# Patient Record
Sex: FEMALE | Race: WHITE | NOT HISPANIC OR LATINO | Employment: UNEMPLOYED | URBAN - METROPOLITAN AREA
[De-identification: names, ages, dates, MRNs, and addresses within clinical notes are randomized per-mention and may not be internally consistent; named-entity substitution may affect disease eponyms.]

---

## 2024-02-04 ENCOUNTER — APPOINTMENT (OUTPATIENT)
Dept: RADIOLOGY | Facility: HOSPITAL | Age: 44
End: 2024-02-04
Payer: MEDICAID

## 2024-02-04 PROCEDURE — 73610 X-RAY EXAM OF ANKLE: CPT | Mod: RT

## 2024-02-04 PROCEDURE — 99284 EMERGENCY DEPT VISIT MOD MDM: CPT | Performed by: EMERGENCY MEDICINE

## 2024-02-04 PROCEDURE — 73610 X-RAY EXAM OF ANKLE: CPT | Mod: RIGHT SIDE | Performed by: RADIOLOGY

## 2024-02-04 ASSESSMENT — PAIN - FUNCTIONAL ASSESSMENT: PAIN_FUNCTIONAL_ASSESSMENT: 0-10

## 2024-02-04 ASSESSMENT — COLUMBIA-SUICIDE SEVERITY RATING SCALE - C-SSRS
2. HAVE YOU ACTUALLY HAD ANY THOUGHTS OF KILLING YOURSELF?: NO
1. IN THE PAST MONTH, HAVE YOU WISHED YOU WERE DEAD OR WISHED YOU COULD GO TO SLEEP AND NOT WAKE UP?: NO
6. HAVE YOU EVER DONE ANYTHING, STARTED TO DO ANYTHING, OR PREPARED TO DO ANYTHING TO END YOUR LIFE?: NO

## 2024-02-04 ASSESSMENT — PAIN SCALES - GENERAL: PAINLEVEL_OUTOF10: 8

## 2024-02-05 ENCOUNTER — APPOINTMENT (OUTPATIENT)
Dept: RADIOLOGY | Facility: HOSPITAL | Age: 44
End: 2024-02-05
Payer: MEDICAID

## 2024-02-05 ENCOUNTER — HOSPITAL ENCOUNTER (EMERGENCY)
Facility: HOSPITAL | Age: 44
Discharge: HOME | End: 2024-02-05
Attending: EMERGENCY MEDICINE
Payer: MEDICAID

## 2024-02-05 VITALS
RESPIRATION RATE: 18 BRPM | WEIGHT: 220 LBS | SYSTOLIC BLOOD PRESSURE: 128 MMHG | DIASTOLIC BLOOD PRESSURE: 70 MMHG | HEART RATE: 75 BPM | BODY MASS INDEX: 31.5 KG/M2 | HEIGHT: 70 IN | OXYGEN SATURATION: 98 % | TEMPERATURE: 97.9 F

## 2024-02-05 DIAGNOSIS — S93.401A SPRAIN OF RIGHT ANKLE, UNSPECIFIED LIGAMENT, INITIAL ENCOUNTER: Primary | ICD-10-CM

## 2024-02-05 PROCEDURE — 2500000001 HC RX 250 WO HCPCS SELF ADMINISTERED DRUGS (ALT 637 FOR MEDICARE OP)

## 2024-02-05 PROCEDURE — 73630 X-RAY EXAM OF FOOT: CPT | Mod: RT,FR

## 2024-02-05 PROCEDURE — 73630 X-RAY EXAM OF FOOT: CPT | Mod: RIGHT SIDE | Performed by: RADIOLOGY

## 2024-02-05 RX ORDER — HYDROCODONE BITARTRATE AND ACETAMINOPHEN 5; 325 MG/1; MG/1
1 TABLET ORAL EVERY 6 HOURS PRN
Qty: 12 TABLET | Refills: 0 | Status: SHIPPED | OUTPATIENT
Start: 2024-02-05 | End: 2024-02-08

## 2024-02-05 RX ORDER — OXYCODONE AND ACETAMINOPHEN 5; 325 MG/1; MG/1
1 TABLET ORAL ONCE
Status: COMPLETED | OUTPATIENT
Start: 2024-02-05 | End: 2024-02-05

## 2024-02-05 RX ADMIN — OXYCODONE HYDROCHLORIDE AND ACETAMINOPHEN 1 TABLET: 5; 325 TABLET ORAL at 01:36

## 2024-02-05 ASSESSMENT — LIFESTYLE VARIABLES
EVER FELT BAD OR GUILTY ABOUT YOUR DRINKING: NO
EVER HAD A DRINK FIRST THING IN THE MORNING TO STEADY YOUR NERVES TO GET RID OF A HANGOVER: NO
HAVE PEOPLE ANNOYED YOU BY CRITICIZING YOUR DRINKING: NO
HAVE YOU EVER FELT YOU SHOULD CUT DOWN ON YOUR DRINKING: NO

## 2024-02-05 ASSESSMENT — PAIN SCALES - GENERAL: PAINLEVEL_OUTOF10: 2

## 2024-02-05 ASSESSMENT — PAIN - FUNCTIONAL ASSESSMENT: PAIN_FUNCTIONAL_ASSESSMENT: 0-10

## 2024-02-05 NOTE — ED PROVIDER NOTES
EMERGENCY DEPARTMENT ENCOUNTER      Pt Name: Suzie Avila  MRN: [id number]  Birthdate 1980  Date of evaluation: 2024  Provider: Jaylan Garcia DO    CHIEF COMPLAINT       Chief Complaint   Patient presents with    Fall     Pt states she missed last step and twisted ankle. Swelling noted around R ankle. Pulses noted. Sensation intact, unable to put weight on foot.          HISTORY OF PRESENT ILLNESS    This is a 43-year-old female who arrives to the emergency department chief complaint of right ankle pain and swelling after mechanical fall states that she was going down basement stairs and missed a step.  She denies loss of consciousness denies blood thinners denies hitting her head.  She has no nausea or vomiting no shortness of breath.  She states that after the injury she was unable to bear weight on it due to pain.  She does have history of sprains of the right ankle it has been years since she is sprained in the past.  She describes an inversion injury just prior to arrival.  She states that she was brought to the emergency department by her .  She has not tried any pain medication for the ankle pain yet.  She does have a history significant for GERD as well as anxiety.  History of  tonsillectomy no ankle surgeries.  She is a pack per day smoker, denies alcohol or drug use.  She is allergic to gabapentin.      History provided by:  Patient and medical records      Nursing Notes were reviewed.    PAST MEDICAL HISTORY   No past medical history on file.      SURGICAL HISTORY     No past surgical history on file.      CURRENT MEDICATIONS       Previous Medications    No medications on file       ALLERGIES     Gabapentin    FAMILY HISTORY     No family history on file.       SOCIAL HISTORY       Social History     Socioeconomic History    Marital status:      Spouse name: Not on file    Number of children: Not on file    Years of education: Not on file    Highest education level: Not  on file   Occupational History    Not on file   Tobacco Use    Smoking status: Not on file    Smokeless tobacco: Not on file   Substance and Sexual Activity    Alcohol use: Not on file    Drug use: Not on file    Sexual activity: Not on file   Other Topics Concern    Not on file   Social History Narrative    Not on file     Social Determinants of Health     Financial Resource Strain: Not on file   Food Insecurity: Not on file   Transportation Needs: Not on file   Physical Activity: Not on file   Stress: Not on file   Social Connections: Not on file   Intimate Partner Violence: Not on file   Housing Stability: Not on file       SCREENINGS                        PHYSICAL EXAM    (up to 7 for level 4, 8 or more for level 5)     ED Triage Vitals [02/04/24 2324]   Temperature Heart Rate Respirations BP   36.6 °C (97.9 °F) 70 18 121/62      Pulse Ox Temp Source Heart Rate Source Patient Position   97 % Temporal -- Sitting      BP Location FiO2 (%)     Right arm --       Physical Exam  Vitals and nursing note reviewed.   Constitutional:       General: She is not in acute distress.     Appearance: Normal appearance. She is well-developed. She is obese. She is not ill-appearing, toxic-appearing or diaphoretic.   HENT:      Head: Normocephalic and atraumatic.   Eyes:      Conjunctiva/sclera: Conjunctivae normal.   Cardiovascular:      Rate and Rhythm: Normal rate and regular rhythm.      Heart sounds: No murmur heard.  Pulmonary:      Effort: Pulmonary effort is normal. No respiratory distress.      Breath sounds: Normal breath sounds.   Abdominal:      Palpations: Abdomen is soft.      Tenderness: There is no abdominal tenderness.   Musculoskeletal:         General: Swelling and signs of injury present.      Cervical back: Neck supple.      Right ankle: Swelling, deformity and ecchymosis present. Decreased range of motion.   Skin:     General: Skin is warm and dry.      Capillary Refill: Capillary refill takes less than 2  seconds.   Neurological:      Mental Status: She is alert.   Psychiatric:         Mood and Affect: Mood normal.          DIAGNOSTIC RESULTS     LABS:  Labs Reviewed - No data to display    All other labs were within normal range or not returned as of this dictation.    Imaging  XR ankle right 3+ views   Final Result   No acute abnormality..   Signed by Deven Rapp DO           Procedures  Procedures     EMERGENCY DEPARTMENT COURSE/MDM:     ED Course as of 02/05/24 0155 Mon Feb 05, 2024 0042 X-ray of the ankle shows: There is no displaced fracture.  The alignment is anatomic.  No soft  tissue abnormality is seen.   [PV]   0059 X-ray of the foot ordered due to right fifth and fourth metatarsal tenderness.  Patient given a Percocet for pain management. [PV]      ED Course User Index  [PV] Jaylan Garcia DO         Diagnoses as of 02/05/24 0155   Sprain of right ankle, unspecified ligament, initial encounter        Medical Decision Making  This is a 43-year-old female arrives a chief complaint of right ankle pain and swelling.  Patient states she had a mechanical fall, missed a step.  Patient has not taken any medication for pain and x-ray of the right ankle was ordered.  There is no fracture found on x-ray.  Patient does have swelling, states she cannot bear weight on the ankle.  She does have present dorsalis pedis and posterior tibialis, equal motor and sensation in the toes.  Patient states that she was driven here by her .  Please see ED course for further medical decision making.No further fractures were seen on foot or ankle x-ray, she was given crutches and instructed on how to use crutches.Patient given orthopedic follow-up, understands plan of care.  Patient discharged home driven home by her .    =================Attending note===============    The patient was seen by the resident/fellow.  I have personally performed a substantive portion of the encounter.  I have seen and examined  the patient; agree with the workup, evaluation, MDM,   management and diagnosis.  The care plan has been discussed with the resident; I have reviewed the resident's note and agree with the documented findings.      43-year-old female to emerged part with chief complaint of right ankle and foot pain.  She was going down the steps and she missed the last step and inverted her ankle.  She not hit her head.  No blood thinners.  No nausea or vomiting.  No other injuries except for she did injure her fingernail on her right fifth finger.  There is no dizziness or passing out.  No chest pain or shortness of breath.  Her only medications are Prozac, Lyrica, Ativan.    Heart is regular.  Lungs are clear.  Abdomen is soft and nontender.  Head atraumatic.  No cervical spine tenderness.  No pain at the knee or over the proximal fibula.  There is tenderness over the lateral malleolus.  There is also some tenderness over the fourth and fifth metatarsals.  There is some mild swelling.  Sensation intact.  Cap refill brisk.  Pulses intact.    Ankle x-ray has no fractures.    Patient is placed in ankle stirrup brace.  She is given crutches.  She is discharged home.  She is to follow-up with primary care.  She is to follow-up with orthopedics.  She is to return to the nearest ER for any new or worsening symptoms.    ==========================================            Patient and or family in agreement and understanding of treatment plan.  All questions answered.      I reviewed the case with the attending ED physician. The attending ED physician agrees with the plan. Patient and/or patient´s representative was counseled regarding labs, imaging, likely diagnosis, and plan. All questions were answered.    ED Medications administered this visit:  Medications - No data to display    New Prescriptions from this visit:    New Prescriptions    No medications on file       Follow-up:  No follow-up provider specified.      Final Impression:  No diagnosis found.      (Please note that portions of this note were completed with a voice recognition program.  Efforts were made to edit the dictations but occasionally words are mis-transcribed.)     Jaylan Garcia,   Resident  02/05/24 0447

## 2024-02-05 NOTE — DISCHARGE INSTRUCTIONS
Seek immediate medical attention if you develop: increasing pain, numbness, tingling, weakness, loss of motion in your ankle or toes, your foot becomes pale or cold, or you develop any new or worsening symptoms.    Seek immediate medical attention if you develop: new or worsening headache, nausea, vomiting, confusion, weakness, loss of motion in your arms or legs, loss of control of your urine or stool, difficulty waking from sleep, or any new or worsening symptoms.    Have someone check on you and wake you from sleep every 2 hours for the next 24 hours to make sure that you are doing well.

## 2024-04-03 ENCOUNTER — HOSPITAL ENCOUNTER (EMERGENCY)
Facility: HOSPITAL | Age: 44
Discharge: HOME | End: 2024-04-03
Attending: STUDENT IN AN ORGANIZED HEALTH CARE EDUCATION/TRAINING PROGRAM
Payer: MEDICAID

## 2024-04-03 ENCOUNTER — APPOINTMENT (OUTPATIENT)
Dept: RADIOLOGY | Facility: HOSPITAL | Age: 44
End: 2024-04-03
Payer: MEDICAID

## 2024-04-03 VITALS
RESPIRATION RATE: 15 BRPM | DIASTOLIC BLOOD PRESSURE: 70 MMHG | HEART RATE: 76 BPM | WEIGHT: 220.46 LBS | OXYGEN SATURATION: 98 % | HEIGHT: 70 IN | TEMPERATURE: 98.1 F | SYSTOLIC BLOOD PRESSURE: 114 MMHG | BODY MASS INDEX: 31.56 KG/M2

## 2024-04-03 DIAGNOSIS — R51.9 NONINTRACTABLE HEADACHE, UNSPECIFIED CHRONICITY PATTERN, UNSPECIFIED HEADACHE TYPE: Primary | ICD-10-CM

## 2024-04-03 LAB
ALBUMIN SERPL BCP-MCNC: 4.6 G/DL (ref 3.4–5)
ALP SERPL-CCNC: 67 U/L (ref 33–110)
ALT SERPL W P-5'-P-CCNC: 9 U/L (ref 7–45)
ANION GAP SERPL CALC-SCNC: 13 MMOL/L (ref 10–20)
AST SERPL W P-5'-P-CCNC: 9 U/L (ref 9–39)
B-HCG SERPL-ACNC: <2 MIU/ML
BASOPHILS # BLD AUTO: 0.04 X10*3/UL (ref 0–0.1)
BASOPHILS NFR BLD AUTO: 0.2 %
BILIRUB SERPL-MCNC: 0.3 MG/DL (ref 0–1.2)
BUN SERPL-MCNC: 8 MG/DL (ref 6–23)
CALCIUM SERPL-MCNC: 9.9 MG/DL (ref 8.6–10.3)
CHLORIDE SERPL-SCNC: 101 MMOL/L (ref 98–107)
CO2 SERPL-SCNC: 26 MMOL/L (ref 21–32)
CREAT SERPL-MCNC: 1.04 MG/DL (ref 0.5–1.05)
CRP SERPL-MCNC: <0.1 MG/DL
EGFRCR SERPLBLD CKD-EPI 2021: 68 ML/MIN/1.73M*2
EOSINOPHIL # BLD AUTO: 0.35 X10*3/UL (ref 0–0.7)
EOSINOPHIL NFR BLD AUTO: 1.9 %
ERYTHROCYTE [DISTWIDTH] IN BLOOD BY AUTOMATED COUNT: 16.6 % (ref 11.5–14.5)
ERYTHROCYTE [SEDIMENTATION RATE] IN BLOOD BY WESTERGREN METHOD: 34 MM/H (ref 0–20)
FLUAV RNA RESP QL NAA+PROBE: NOT DETECTED
FLUBV RNA RESP QL NAA+PROBE: NOT DETECTED
GLUCOSE SERPL-MCNC: 84 MG/DL (ref 74–99)
HCT VFR BLD AUTO: 40.4 % (ref 36–46)
HGB BLD-MCNC: 12.5 G/DL (ref 12–16)
HOLD SPECIMEN: NORMAL
IMM GRANULOCYTES # BLD AUTO: 0.09 X10*3/UL (ref 0–0.7)
IMM GRANULOCYTES NFR BLD AUTO: 0.5 % (ref 0–0.9)
LYMPHOCYTES # BLD AUTO: 3.25 X10*3/UL (ref 1.2–4.8)
LYMPHOCYTES NFR BLD AUTO: 17.7 %
MCH RBC QN AUTO: 26 PG (ref 26–34)
MCHC RBC AUTO-ENTMCNC: 30.9 G/DL (ref 32–36)
MCV RBC AUTO: 84 FL (ref 80–100)
MONOCYTES # BLD AUTO: 1.23 X10*3/UL (ref 0.1–1)
MONOCYTES NFR BLD AUTO: 6.7 %
NEUTROPHILS # BLD AUTO: 13.41 X10*3/UL (ref 1.2–7.7)
NEUTROPHILS NFR BLD AUTO: 73 %
NRBC BLD-RTO: 0 /100 WBCS (ref 0–0)
PLATELET # BLD AUTO: 286 X10*3/UL (ref 150–450)
POTASSIUM SERPL-SCNC: 3.8 MMOL/L (ref 3.5–5.3)
PROT SERPL-MCNC: 7.4 G/DL (ref 6.4–8.2)
RBC # BLD AUTO: 4.81 X10*6/UL (ref 4–5.2)
SARS-COV-2 RNA RESP QL NAA+PROBE: NOT DETECTED
SODIUM SERPL-SCNC: 136 MMOL/L (ref 136–145)
WBC # BLD AUTO: 18.4 X10*3/UL (ref 4.4–11.3)

## 2024-04-03 PROCEDURE — 85652 RBC SED RATE AUTOMATED: CPT | Performed by: STUDENT IN AN ORGANIZED HEALTH CARE EDUCATION/TRAINING PROGRAM

## 2024-04-03 PROCEDURE — 87636 SARSCOV2 & INF A&B AMP PRB: CPT | Performed by: STUDENT IN AN ORGANIZED HEALTH CARE EDUCATION/TRAINING PROGRAM

## 2024-04-03 PROCEDURE — 86140 C-REACTIVE PROTEIN: CPT | Performed by: STUDENT IN AN ORGANIZED HEALTH CARE EDUCATION/TRAINING PROGRAM

## 2024-04-03 PROCEDURE — 2500000004 HC RX 250 GENERAL PHARMACY W/ HCPCS (ALT 636 FOR OP/ED)

## 2024-04-03 PROCEDURE — 2500000004 HC RX 250 GENERAL PHARMACY W/ HCPCS (ALT 636 FOR OP/ED): Performed by: STUDENT IN AN ORGANIZED HEALTH CARE EDUCATION/TRAINING PROGRAM

## 2024-04-03 PROCEDURE — 96361 HYDRATE IV INFUSION ADD-ON: CPT

## 2024-04-03 PROCEDURE — 84702 CHORIONIC GONADOTROPIN TEST: CPT | Performed by: STUDENT IN AN ORGANIZED HEALTH CARE EDUCATION/TRAINING PROGRAM

## 2024-04-03 PROCEDURE — 70450 CT HEAD/BRAIN W/O DYE: CPT

## 2024-04-03 PROCEDURE — 85025 COMPLETE CBC W/AUTO DIFF WBC: CPT | Performed by: STUDENT IN AN ORGANIZED HEALTH CARE EDUCATION/TRAINING PROGRAM

## 2024-04-03 PROCEDURE — 64505 N BLOCK SPENOPALATINE GANGL: CPT

## 2024-04-03 PROCEDURE — 36415 COLL VENOUS BLD VENIPUNCTURE: CPT | Performed by: STUDENT IN AN ORGANIZED HEALTH CARE EDUCATION/TRAINING PROGRAM

## 2024-04-03 PROCEDURE — 99284 EMERGENCY DEPT VISIT MOD MDM: CPT | Mod: 25

## 2024-04-03 PROCEDURE — 96375 TX/PRO/DX INJ NEW DRUG ADDON: CPT

## 2024-04-03 PROCEDURE — 70450 CT HEAD/BRAIN W/O DYE: CPT | Performed by: RADIOLOGY

## 2024-04-03 PROCEDURE — 2500000001 HC RX 250 WO HCPCS SELF ADMINISTERED DRUGS (ALT 637 FOR MEDICARE OP): Performed by: STUDENT IN AN ORGANIZED HEALTH CARE EDUCATION/TRAINING PROGRAM

## 2024-04-03 PROCEDURE — 96365 THER/PROPH/DIAG IV INF INIT: CPT

## 2024-04-03 PROCEDURE — 80053 COMPREHEN METABOLIC PANEL: CPT | Performed by: STUDENT IN AN ORGANIZED HEALTH CARE EDUCATION/TRAINING PROGRAM

## 2024-04-03 PROCEDURE — 96366 THER/PROPH/DIAG IV INF ADDON: CPT

## 2024-04-03 PROCEDURE — 2500000005 HC RX 250 GENERAL PHARMACY W/O HCPCS

## 2024-04-03 RX ORDER — KETOROLAC TROMETHAMINE 30 MG/ML
30 INJECTION, SOLUTION INTRAMUSCULAR; INTRAVENOUS ONCE
Status: COMPLETED | OUTPATIENT
Start: 2024-04-03 | End: 2024-04-03

## 2024-04-03 RX ORDER — OXYCODONE AND ACETAMINOPHEN 5; 325 MG/1; MG/1
1 TABLET ORAL ONCE
Status: DISCONTINUED | OUTPATIENT
Start: 2024-04-03 | End: 2024-04-03

## 2024-04-03 RX ORDER — LIDOCAINE HYDROCHLORIDE 20 MG/ML
5 INJECTION, SOLUTION EPIDURAL; INFILTRATION; INTRACAUDAL; PERINEURAL ONCE
Status: COMPLETED | OUTPATIENT
Start: 2024-04-03 | End: 2024-04-03

## 2024-04-03 RX ORDER — OXYCODONE AND ACETAMINOPHEN 5; 325 MG/1; MG/1
1 TABLET ORAL ONCE
Status: COMPLETED | OUTPATIENT
Start: 2024-04-03 | End: 2024-04-03

## 2024-04-03 RX ORDER — METOCLOPRAMIDE HYDROCHLORIDE 5 MG/ML
5 INJECTION INTRAMUSCULAR; INTRAVENOUS ONCE
Status: COMPLETED | OUTPATIENT
Start: 2024-04-03 | End: 2024-04-03

## 2024-04-03 RX ORDER — DIPHENHYDRAMINE HYDROCHLORIDE 50 MG/ML
25 INJECTION INTRAMUSCULAR; INTRAVENOUS ONCE
Status: COMPLETED | OUTPATIENT
Start: 2024-04-03 | End: 2024-04-03

## 2024-04-03 RX ORDER — MAGNESIUM SULFATE HEPTAHYDRATE 40 MG/ML
2 INJECTION, SOLUTION INTRAVENOUS ONCE
Status: COMPLETED | OUTPATIENT
Start: 2024-04-03 | End: 2024-04-03

## 2024-04-03 RX ORDER — ACETAMINOPHEN 325 MG/1
650 TABLET ORAL ONCE
Status: COMPLETED | OUTPATIENT
Start: 2024-04-03 | End: 2024-04-03

## 2024-04-03 RX ADMIN — METOCLOPRAMIDE 5 MG: 5 INJECTION, SOLUTION INTRAMUSCULAR; INTRAVENOUS at 02:26

## 2024-04-03 RX ADMIN — SODIUM CHLORIDE, POTASSIUM CHLORIDE, SODIUM LACTATE AND CALCIUM CHLORIDE 1000 ML: 600; 310; 30; 20 INJECTION, SOLUTION INTRAVENOUS at 02:25

## 2024-04-03 RX ADMIN — LIDOCAINE HYDROCHLORIDE 100 MG: 20 INJECTION, SOLUTION EPIDURAL; INFILTRATION; INTRACAUDAL; PERINEURAL at 05:42

## 2024-04-03 RX ADMIN — KETOROLAC TROMETHAMINE 30 MG: 30 INJECTION, SOLUTION INTRAMUSCULAR; INTRAVENOUS at 04:23

## 2024-04-03 RX ADMIN — ACETAMINOPHEN 650 MG: 325 TABLET ORAL at 04:21

## 2024-04-03 RX ADMIN — OXYCODONE HYDROCHLORIDE AND ACETAMINOPHEN 1 TABLET: 5; 325 TABLET ORAL at 06:35

## 2024-04-03 RX ADMIN — DEXAMETHASONE 10 MG: 6 TABLET ORAL at 04:38

## 2024-04-03 RX ADMIN — MAGNESIUM SULFATE HEPTAHYDRATE 2 G: 40 INJECTION, SOLUTION INTRAVENOUS at 04:38

## 2024-04-03 RX ADMIN — DIPHENHYDRAMINE HYDROCHLORIDE 25 MG: 50 INJECTION, SOLUTION INTRAMUSCULAR; INTRAVENOUS at 02:26

## 2024-04-03 ASSESSMENT — PAIN DESCRIPTION - FREQUENCY: FREQUENCY: CONSTANT/CONTINUOUS

## 2024-04-03 ASSESSMENT — PAIN SCALES - GENERAL
PAINLEVEL_OUTOF10: 10 - WORST POSSIBLE PAIN
PAINLEVEL_OUTOF10: 5 - MODERATE PAIN
PAINLEVEL_OUTOF10: 8
PAINLEVEL_OUTOF10: 8

## 2024-04-03 ASSESSMENT — PAIN DESCRIPTION - LOCATION
LOCATION: HEAD

## 2024-04-03 ASSESSMENT — PAIN - FUNCTIONAL ASSESSMENT
PAIN_FUNCTIONAL_ASSESSMENT: 0-10
PAIN_FUNCTIONAL_ASSESSMENT: 0-10

## 2024-04-03 ASSESSMENT — PAIN DESCRIPTION - ONSET: ONSET: GRADUAL

## 2024-04-03 ASSESSMENT — PAIN DESCRIPTION - ORIENTATION
ORIENTATION: RIGHT
ORIENTATION: RIGHT

## 2024-04-03 ASSESSMENT — PAIN DESCRIPTION - DIRECTION: RADIATING_TOWARDS: EARS

## 2024-04-03 ASSESSMENT — PAIN DESCRIPTION - PROGRESSION: CLINICAL_PROGRESSION: GRADUALLY WORSENING

## 2024-04-03 NOTE — ED PROVIDER NOTES
HPI   Chief Complaint   Patient presents with    Headache     Pt has had a head ache for two days.  States her left ear also feels fuzzy and her right ear feels like t is being jabbed by a knife sharpening tool       Patient is a 44-year-old female with history of migraines presenting to the emergency department for headache.  It was gradual in onset and has been present over the last 2 days.  She feels as if her right eye is being stabbed through her head.  She also feels like her ears are full or fuzzy.  She does have history of migraines but this does not feel like a typical migraine for her.  She has tried taking Excedrin at home without pain relief.  She feels as if her eyes have been blurry, although she is not completely sure.  She does wear glasses.  She denies any fevers, chest pain, shortness of breath, abdominal pain.  She does report nausea, denies vomiting.      History provided by:  Patient                      [place] Coma Scale Score: 15         NIH Stroke Scale: 0             Patient History   History reviewed. No pertinent past medical history.  History reviewed. No pertinent surgical history.  No family history on file.  Social History     Tobacco Use    Smoking status: Every Day     Packs/day: 1     Types: Cigarettes    Smokeless tobacco: Never   Vaping Use    Vaping Use: Never used   Substance Use Topics    Alcohol use: Never    Drug use: Never       Physical Exam   ED Triage Vitals [04/03/24 0026]   Temperature Heart Rate Respirations BP   36.5 °C (97.7 °F) 88 18 (!) 145/93      Pulse Ox Temp Source Heart Rate Source Patient Position   98 % Temporal Monitor Sitting      BP Location FiO2 (%)     Right arm --       Physical Exam  Vitals and nursing note reviewed.   Constitutional:       General: She is not in acute distress.     Appearance: She is well-developed.   HENT:      Head: Normocephalic and atraumatic.      Comments: Tenderness of the right temple.     Right Ear: External ear normal.       Left Ear: External ear normal.      Nose: Nose normal.      Mouth/Throat:      Mouth: Mucous membranes are moist.   Eyes:      General: No scleral icterus.     Extraocular Movements: Extraocular movements intact.      Conjunctiva/sclera: Conjunctivae normal.      Pupils: Pupils are equal, round, and reactive to light.   Cardiovascular:      Rate and Rhythm: Normal rate and regular rhythm.      Heart sounds: No murmur heard.  Pulmonary:      Effort: Pulmonary effort is normal. No respiratory distress.      Breath sounds: Normal breath sounds.   Abdominal:      Palpations: Abdomen is soft.      Tenderness: There is no abdominal tenderness.   Musculoskeletal:         General: No swelling.      Cervical back: Neck supple.   Skin:     General: Skin is warm and dry.   Neurological:      General: No focal deficit present.      Mental Status: She is alert and oriented to person, place, and time.      Cranial Nerves: No cranial nerve deficit.      Sensory: No sensory deficit.      Motor: No weakness.      Coordination: Coordination normal.   Psychiatric:         Mood and Affect: Mood normal.         ED Course & MDM   Diagnoses as of 04/03/24 0729   Nonintractable headache, unspecified chronicity pattern, unspecified headache type       Medical Decision Making  Patient is a 44-year-old female presenting to the emergency department for headache.  On arrival, she is afebrile and hemodynamically stable.  No focal neurological deficits.  No visual field deficits.  This headache was slow in onset rather than a sudden headache.  Low suspicion for subarachnoid hemorrhage.  She is afebrile and does not have any neck stiffness.  Low suspicion for meningitis.  Given that she feels like this headache is different than her usual migraines, we will obtain CT head to eval for any abnormalities.  Patient is given Reglan, IV fluids, Benadryl.  She does report that she has tenderness of her right temple.  Will obtain lab work and ESR and CRP  to evaluate for temporal arteritis.    CBC shows leukocytosis 18.4.  Unclear etiology.  No anemia.  CMP unremarkable.  Negative pregnancy.  CRP within normal limits.  ESR slightly elevated at 34.  Low suspicion for temporal arteritis based on lab work.  CT head unremarkable.    Patient is reevaluated.  She reports that she still has a headache.  She is given dexamethasone, Toradol, magnesium, Tylenol.  On reevaluation, patient reports that her pain is still 8/10.  Sphenopalatine ganglion block is performed with 2% lidocaine without epinephrine.  She reports some mild improvement but is still having the same headache.  At this time, patient has received multimodal and multiple doses of medications.  Lumbar puncture for meningitis rule out and admission to the hospital for further management treatment was offered for the patient.  However, after discussion of the risk and benefits, patient would like to attempt to go home and rest.  She is given neurology follow-up and instructed to follow-up with neurology as well as her primary care physician.  She is instructed to return to the hospital if she should develop any symptoms such as neck stiffness, fevers, worsening pain, confusion, or if they have any questions or concerns.  Patient's  will be at home with her.  Home care and return instructions discussed. Patient expressed understanding and agreement. Patient discharged in stable condition.    Efe Holt DO, PGY-3  Emergency Medicine Resident        Amount and/or Complexity of Data Reviewed  Labs: ordered.  Radiology: ordered.  ECG/medicine tests: ordered.        Procedure  Procedures     Efe Holt DO  Resident  04/03/24 2538

## 2024-05-31 ENCOUNTER — HOSPITAL ENCOUNTER (EMERGENCY)
Facility: HOSPITAL | Age: 44
Discharge: HOME | End: 2024-05-31
Attending: STUDENT IN AN ORGANIZED HEALTH CARE EDUCATION/TRAINING PROGRAM
Payer: MEDICAID

## 2024-05-31 VITALS
TEMPERATURE: 97 F | HEART RATE: 60 BPM | RESPIRATION RATE: 16 BRPM | SYSTOLIC BLOOD PRESSURE: 131 MMHG | OXYGEN SATURATION: 100 % | HEIGHT: 70 IN | WEIGHT: 220 LBS | BODY MASS INDEX: 31.5 KG/M2 | DIASTOLIC BLOOD PRESSURE: 64 MMHG

## 2024-05-31 DIAGNOSIS — H10.32 ACUTE CONJUNCTIVITIS OF LEFT EYE, UNSPECIFIED ACUTE CONJUNCTIVITIS TYPE: Primary | ICD-10-CM

## 2024-05-31 PROCEDURE — 99283 EMERGENCY DEPT VISIT LOW MDM: CPT

## 2024-05-31 PROCEDURE — 99284 EMERGENCY DEPT VISIT MOD MDM: CPT | Performed by: STUDENT IN AN ORGANIZED HEALTH CARE EDUCATION/TRAINING PROGRAM

## 2024-05-31 RX ORDER — ERYTHROMYCIN 5 MG/G
OINTMENT OPHTHALMIC 4 TIMES DAILY
Qty: 3.5 G | Refills: 0 | Status: SHIPPED | OUTPATIENT
Start: 2024-05-31 | End: 2024-06-07

## 2024-05-31 ASSESSMENT — PAIN - FUNCTIONAL ASSESSMENT: PAIN_FUNCTIONAL_ASSESSMENT: 0-10

## 2024-05-31 ASSESSMENT — LIFESTYLE VARIABLES
TOTAL SCORE: 0
HAVE PEOPLE ANNOYED YOU BY CRITICIZING YOUR DRINKING: NO
EVER HAD A DRINK FIRST THING IN THE MORNING TO STEADY YOUR NERVES TO GET RID OF A HANGOVER: NO
EVER FELT BAD OR GUILTY ABOUT YOUR DRINKING: NO
HAVE YOU EVER FELT YOU SHOULD CUT DOWN ON YOUR DRINKING: NO

## 2024-05-31 ASSESSMENT — PAIN DESCRIPTION - PAIN TYPE: TYPE: ACUTE PAIN

## 2024-05-31 ASSESSMENT — PAIN DESCRIPTION - LOCATION: LOCATION: EYE

## 2024-05-31 ASSESSMENT — PAIN DESCRIPTION - DESCRIPTORS: DESCRIPTORS: ACHING

## 2024-05-31 ASSESSMENT — PAIN DESCRIPTION - ORIENTATION: ORIENTATION: LEFT

## 2024-05-31 ASSESSMENT — PAIN SCALES - GENERAL: PAINLEVEL_OUTOF10: 9

## 2024-05-31 NOTE — DISCHARGE INSTRUCTIONS
Follow-up with your primary care provider as well as ophthalmology discuss your ER visit.  If you develop any vision changes, swelling of the eye or eyelids, or if you have any other concerns, please return to the ER for further care.

## 2024-05-31 NOTE — ED PROVIDER NOTES
"HPI   Chief Complaint   Patient presents with    Eye Drainage     Pt c/o pink eye which is painful and has been matting closed with green \"gunk\". Pt states that two other people in her house have been diagnosed with pink eye.        44-year-old female with no significant past medical history presented to the ER due to concern for potential pinkeye.  Patient states that she awoke this evening with crusting over her left eye as well as hair in her eye.  She reported greenish discharge.  She reports that pinkeye is running through family and her  currently has it.  She otherwise denies any fevers, chills, chest pain, shortness of breath, abdominal pain, sore throat, ear pain.      History provided by:  Patient   used: No                        [place] Coma Scale Score: 15                     Patient History   History reviewed. No pertinent past medical history.  History reviewed. No pertinent surgical history.  No family history on file.  Social History     Tobacco Use    Smoking status: Every Day     Current packs/day: 1.00     Types: Cigarettes    Smokeless tobacco: Never   Vaping Use    Vaping status: Never Used   Substance Use Topics    Alcohol use: Never    Drug use: Never       Physical Exam   ED Triage Vitals [05/31/24 0050]   Temperature Heart Rate Respirations BP   36.1 °C (97 °F) 66 18 136/75      Pulse Ox Temp Source Heart Rate Source Patient Position   99 % Temporal Monitor Sitting      BP Location FiO2 (%)     Right arm --       Physical Exam  Vitals and nursing note reviewed.   HENT:      Head: Normocephalic.      Right Ear: External ear normal.      Left Ear: External ear normal.      Nose: Nose normal.      Mouth/Throat:      Mouth: Mucous membranes are moist.   Eyes:      Extraocular Movements: Extraocular movements intact.      Pupils: Pupils are equal, round, and reactive to light.      Comments: Left eye conjunctiva mildly erythematous compared to right   Cardiovascular: "      Rate and Rhythm: Normal rate and regular rhythm.   Pulmonary:      Effort: Pulmonary effort is normal.      Breath sounds: No wheezing or rhonchi.   Abdominal:      General: Abdomen is flat. There is no distension.      Tenderness: There is no abdominal tenderness.   Musculoskeletal:         General: No signs of injury.   Skin:     General: Skin is warm.      Capillary Refill: Capillary refill takes less than 2 seconds.   Neurological:      General: No focal deficit present.      Mental Status: She is alert. Mental status is at baseline.   Psychiatric:         Mood and Affect: Mood normal.         ED Course & MDM   Diagnoses as of 05/31/24 0117   Acute conjunctivitis of left eye, unspecified acute conjunctivitis type       Medical Decision Making  44-year-old female present to the ER due to concern for conjunctivitis. On arrival she was in no acute distress. Vitals are stable. Left eye mildly erythematous conjunctivocompared to right.  No signs of entrapment pupils equal and reactive to light bilaterally.  Will cover patient for conjunctivitis.  Prescription of erythromycin ointment ordered for patient as she requested an ointment versus drops.  She was instructed to follow-up with her primary care provider as well as with ophthalmology.  Strict return precautions were given.  Patient was understanding and agreeable with plan for discharge.    Risk  Prescription drug management.        Procedure  Procedures     Armen Ruiz DO  Resident  05/31/24 0119

## 2025-02-08 ENCOUNTER — APPOINTMENT (OUTPATIENT)
Dept: RADIOLOGY | Facility: HOSPITAL | Age: 45
End: 2025-02-08
Payer: MEDICAID

## 2025-02-08 ENCOUNTER — HOSPITAL ENCOUNTER (EMERGENCY)
Facility: HOSPITAL | Age: 45
Discharge: HOME | End: 2025-02-09
Attending: EMERGENCY MEDICINE
Payer: MEDICAID

## 2025-02-08 DIAGNOSIS — R11.0 NAUSEA: ICD-10-CM

## 2025-02-08 DIAGNOSIS — R10.9 FLANK PAIN: Primary | ICD-10-CM

## 2025-02-08 DIAGNOSIS — N20.0 BILATERAL KIDNEY STONES: ICD-10-CM

## 2025-02-08 DIAGNOSIS — N12 PYELONEPHRITIS: ICD-10-CM

## 2025-02-08 LAB
ALBUMIN SERPL BCP-MCNC: 4.7 G/DL (ref 3.4–5)
ALP SERPL-CCNC: 70 U/L (ref 33–110)
ALT SERPL W P-5'-P-CCNC: 9 U/L (ref 7–45)
ANION GAP SERPL CALC-SCNC: 12 MMOL/L (ref 10–20)
APPEARANCE UR: ABNORMAL
AST SERPL W P-5'-P-CCNC: 11 U/L (ref 9–39)
B-HCG SERPL-ACNC: <2 MIU/ML
BACTERIA #/AREA URNS AUTO: ABNORMAL /HPF
BASOPHILS # BLD AUTO: 0.02 X10*3/UL (ref 0–0.1)
BASOPHILS NFR BLD AUTO: 0.3 %
BILIRUB SERPL-MCNC: 0.2 MG/DL (ref 0–1.2)
BILIRUB UR STRIP.AUTO-MCNC: NEGATIVE MG/DL
BUN SERPL-MCNC: 11 MG/DL (ref 6–23)
CALCIUM SERPL-MCNC: 9.1 MG/DL (ref 8.6–10.3)
CHLORIDE SERPL-SCNC: 100 MMOL/L (ref 98–107)
CO2 SERPL-SCNC: 27 MMOL/L (ref 21–32)
COLOR UR: ABNORMAL
CREAT SERPL-MCNC: 0.8 MG/DL (ref 0.5–1.05)
EGFRCR SERPLBLD CKD-EPI 2021: >90 ML/MIN/1.73M*2
EOSINOPHIL # BLD AUTO: 0.21 X10*3/UL (ref 0–0.7)
EOSINOPHIL NFR BLD AUTO: 2.8 %
ERYTHROCYTE [DISTWIDTH] IN BLOOD BY AUTOMATED COUNT: 18.5 % (ref 11.5–14.5)
GLUCOSE SERPL-MCNC: 87 MG/DL (ref 74–99)
GLUCOSE UR STRIP.AUTO-MCNC: NORMAL MG/DL
HCT VFR BLD AUTO: 40 % (ref 36–46)
HGB BLD-MCNC: 12.3 G/DL (ref 12–16)
IMM GRANULOCYTES # BLD AUTO: 0.02 X10*3/UL (ref 0–0.7)
IMM GRANULOCYTES NFR BLD AUTO: 0.3 % (ref 0–0.9)
KETONES UR STRIP.AUTO-MCNC: NEGATIVE MG/DL
LEUKOCYTE ESTERASE UR QL STRIP.AUTO: ABNORMAL
LIPASE SERPL-CCNC: 10 U/L (ref 9–82)
LYMPHOCYTES # BLD AUTO: 1.67 X10*3/UL (ref 1.2–4.8)
LYMPHOCYTES NFR BLD AUTO: 22 %
MCH RBC QN AUTO: 26.6 PG (ref 26–34)
MCHC RBC AUTO-ENTMCNC: 30.8 G/DL (ref 32–36)
MCV RBC AUTO: 87 FL (ref 80–100)
MONOCYTES # BLD AUTO: 0.62 X10*3/UL (ref 0.1–1)
MONOCYTES NFR BLD AUTO: 8.2 %
NEUTROPHILS # BLD AUTO: 5.04 X10*3/UL (ref 1.2–7.7)
NEUTROPHILS NFR BLD AUTO: 66.4 %
NITRITE UR QL STRIP.AUTO: NEGATIVE
NRBC BLD-RTO: 0 /100 WBCS (ref 0–0)
PH UR STRIP.AUTO: 6 [PH]
PLATELET # BLD AUTO: 252 X10*3/UL (ref 150–450)
POTASSIUM SERPL-SCNC: 3.9 MMOL/L (ref 3.5–5.3)
PROT SERPL-MCNC: 7.8 G/DL (ref 6.4–8.2)
PROT UR STRIP.AUTO-MCNC: ABNORMAL MG/DL
RBC # BLD AUTO: 4.62 X10*6/UL (ref 4–5.2)
RBC # UR STRIP.AUTO: ABNORMAL MG/DL
RBC #/AREA URNS AUTO: >20 /HPF
SODIUM SERPL-SCNC: 135 MMOL/L (ref 136–145)
SP GR UR STRIP.AUTO: 1.02
SQUAMOUS #/AREA URNS AUTO: ABNORMAL /HPF
UROBILINOGEN UR STRIP.AUTO-MCNC: NORMAL MG/DL
WBC # BLD AUTO: 7.6 X10*3/UL (ref 4.4–11.3)
WBC #/AREA URNS AUTO: >50 /HPF

## 2025-02-08 PROCEDURE — 85025 COMPLETE CBC W/AUTO DIFF WBC: CPT

## 2025-02-08 PROCEDURE — 2500000004 HC RX 250 GENERAL PHARMACY W/ HCPCS (ALT 636 FOR OP/ED)

## 2025-02-08 PROCEDURE — 99285 EMERGENCY DEPT VISIT HI MDM: CPT | Mod: 25 | Performed by: EMERGENCY MEDICINE

## 2025-02-08 PROCEDURE — 74177 CT ABD & PELVIS W/CONTRAST: CPT

## 2025-02-08 PROCEDURE — 84702 CHORIONIC GONADOTROPIN TEST: CPT

## 2025-02-08 PROCEDURE — 96365 THER/PROPH/DIAG IV INF INIT: CPT | Mod: 59

## 2025-02-08 PROCEDURE — 99285 EMERGENCY DEPT VISIT HI MDM: CPT | Performed by: EMERGENCY MEDICINE

## 2025-02-08 PROCEDURE — 80053 COMPREHEN METABOLIC PANEL: CPT

## 2025-02-08 PROCEDURE — 2550000001 HC RX 255 CONTRASTS: Performed by: EMERGENCY MEDICINE

## 2025-02-08 PROCEDURE — 87086 URINE CULTURE/COLONY COUNT: CPT | Mod: STJLAB | Performed by: STUDENT IN AN ORGANIZED HEALTH CARE EDUCATION/TRAINING PROGRAM

## 2025-02-08 PROCEDURE — 96375 TX/PRO/DX INJ NEW DRUG ADDON: CPT

## 2025-02-08 PROCEDURE — 36415 COLL VENOUS BLD VENIPUNCTURE: CPT

## 2025-02-08 PROCEDURE — 83690 ASSAY OF LIPASE: CPT

## 2025-02-08 PROCEDURE — 74177 CT ABD & PELVIS W/CONTRAST: CPT | Performed by: STUDENT IN AN ORGANIZED HEALTH CARE EDUCATION/TRAINING PROGRAM

## 2025-02-08 PROCEDURE — 81001 URINALYSIS AUTO W/SCOPE: CPT | Performed by: STUDENT IN AN ORGANIZED HEALTH CARE EDUCATION/TRAINING PROGRAM

## 2025-02-08 RX ORDER — KETOROLAC TROMETHAMINE 15 MG/ML
15 INJECTION, SOLUTION INTRAMUSCULAR; INTRAVENOUS ONCE
Status: COMPLETED | OUTPATIENT
Start: 2025-02-08 | End: 2025-02-08

## 2025-02-08 RX ORDER — ONDANSETRON HYDROCHLORIDE 2 MG/ML
4 INJECTION, SOLUTION INTRAVENOUS ONCE
Status: COMPLETED | OUTPATIENT
Start: 2025-02-08 | End: 2025-02-08

## 2025-02-08 RX ADMIN — CEFTRIAXONE 2 G: 2 INJECTION, POWDER, FOR SOLUTION INTRAMUSCULAR; INTRAVENOUS at 22:48

## 2025-02-08 RX ADMIN — ONDANSETRON 4 MG: 2 INJECTION INTRAMUSCULAR; INTRAVENOUS at 22:45

## 2025-02-08 RX ADMIN — IOHEXOL 75 ML: 350 INJECTION, SOLUTION INTRAVENOUS at 23:29

## 2025-02-08 RX ADMIN — KETOROLAC TROMETHAMINE 15 MG: 15 INJECTION, SOLUTION INTRAMUSCULAR; INTRAVENOUS at 22:46

## 2025-02-08 ASSESSMENT — PAIN DESCRIPTION - LOCATION
LOCATION: BACK
LOCATION: ABDOMEN

## 2025-02-08 ASSESSMENT — PAIN SCALES - GENERAL
PAINLEVEL_OUTOF10: 10 - WORST POSSIBLE PAIN
PAINLEVEL_OUTOF10: 9
PAINLEVEL_OUTOF10: 6

## 2025-02-08 ASSESSMENT — PAIN - FUNCTIONAL ASSESSMENT
PAIN_FUNCTIONAL_ASSESSMENT: 0-10
PAIN_FUNCTIONAL_ASSESSMENT: 0-10

## 2025-02-08 ASSESSMENT — PAIN DESCRIPTION - ORIENTATION: ORIENTATION: RIGHT

## 2025-02-08 ASSESSMENT — PAIN DESCRIPTION - PAIN TYPE
TYPE: ACUTE PAIN;CHRONIC PAIN
TYPE: ACUTE PAIN

## 2025-02-08 ASSESSMENT — PAIN DESCRIPTION - DIRECTION: RADIATING_TOWARDS: ABDOMEN

## 2025-02-08 ASSESSMENT — COLUMBIA-SUICIDE SEVERITY RATING SCALE - C-SSRS
1. IN THE PAST MONTH, HAVE YOU WISHED YOU WERE DEAD OR WISHED YOU COULD GO TO SLEEP AND NOT WAKE UP?: NO
6. HAVE YOU EVER DONE ANYTHING, STARTED TO DO ANYTHING, OR PREPARED TO DO ANYTHING TO END YOUR LIFE?: NO
2. HAVE YOU ACTUALLY HAD ANY THOUGHTS OF KILLING YOURSELF?: NO

## 2025-02-08 ASSESSMENT — PAIN DESCRIPTION - PROGRESSION: CLINICAL_PROGRESSION: NOT CHANGED

## 2025-02-08 ASSESSMENT — PAIN DESCRIPTION - DESCRIPTORS: DESCRIPTORS: ACHING;SHARP;SHOOTING

## 2025-02-08 ASSESSMENT — PAIN DESCRIPTION - FREQUENCY: FREQUENCY: CONSTANT/CONTINUOUS

## 2025-02-08 ASSESSMENT — PAIN DESCRIPTION - ONSET: ONSET: ONGOING

## 2025-02-09 VITALS
DIASTOLIC BLOOD PRESSURE: 84 MMHG | WEIGHT: 231 LBS | SYSTOLIC BLOOD PRESSURE: 144 MMHG | TEMPERATURE: 97.2 F | BODY MASS INDEX: 33.07 KG/M2 | RESPIRATION RATE: 16 BRPM | HEART RATE: 60 BPM | HEIGHT: 70 IN | OXYGEN SATURATION: 96 %

## 2025-02-09 LAB — HOLD SPECIMEN: NORMAL

## 2025-02-09 PROCEDURE — 2500000004 HC RX 250 GENERAL PHARMACY W/ HCPCS (ALT 636 FOR OP/ED): Mod: JZ

## 2025-02-09 PROCEDURE — 96375 TX/PRO/DX INJ NEW DRUG ADDON: CPT

## 2025-02-09 RX ORDER — ONDANSETRON 4 MG/1
4 TABLET, ORALLY DISINTEGRATING ORAL EVERY 8 HOURS PRN
Qty: 15 TABLET | Refills: 0 | Status: SHIPPED | OUTPATIENT
Start: 2025-02-09

## 2025-02-09 RX ORDER — SULFAMETHOXAZOLE AND TRIMETHOPRIM 800; 160 MG/1; MG/1
1 TABLET ORAL 2 TIMES DAILY
Qty: 28 TABLET | Refills: 0 | Status: SHIPPED | OUTPATIENT
Start: 2025-02-09 | End: 2025-02-23

## 2025-02-09 RX ORDER — OXYCODONE AND ACETAMINOPHEN 5; 325 MG/1; MG/1
1 TABLET ORAL EVERY 6 HOURS PRN
Qty: 12 TABLET | Refills: 0 | Status: SHIPPED | OUTPATIENT
Start: 2025-02-09

## 2025-02-09 RX ADMIN — HYDROMORPHONE HYDROCHLORIDE 0.5 MG: 1 INJECTION, SOLUTION INTRAMUSCULAR; INTRAVENOUS; SUBCUTANEOUS at 00:41

## 2025-02-09 ASSESSMENT — PAIN SCALES - GENERAL
PAINLEVEL_OUTOF10: 6
PAINLEVEL_OUTOF10: 9
PAINLEVEL_OUTOF10: 6

## 2025-02-09 ASSESSMENT — PAIN - FUNCTIONAL ASSESSMENT
PAIN_FUNCTIONAL_ASSESSMENT: 0-10
PAIN_FUNCTIONAL_ASSESSMENT: 0-10

## 2025-02-09 NOTE — DISCHARGE INSTRUCTIONS
Chief Complaint   Patient presents with    Follow-up       HPI:  Patient is here for follow-up     Feeling okay    No complaints    Had labs done at [facility]- reports are not available    Has appt with urology    Seen by his cardiology - note reviewed        Allergy and Medications are reviewed and updated. Past Medical History, Surgical History, and Family History has been reviewed and updated. Review of Systems:  Review of Systems   Constitutional:  Negative for chills and fever. HENT:  Negative for congestion and ear pain. Eyes:  Negative for discharge. Respiratory:  Negative for shortness of breath (No new SOB). Cardiovascular:  Negative for chest pain and leg swelling. Gastrointestinal:  Negative for abdominal pain, blood in stool and nausea. Endocrine: Negative for polydipsia. Genitourinary:  Negative for flank pain and hematuria. Musculoskeletal:  Negative for myalgias and neck pain. Skin:  Negative for rash. Neurological:  Negative for dizziness and seizures. Hematological:  Does not bruise/bleed easily. Psychiatric/Behavioral:  Negative for hallucinations and suicidal ideas. Vitals:    11/30/22 0912   BP: 124/72   Pulse: 69   Resp: 18   Temp: 97 °F (36.1 °C)   TempSrc: Temporal   SpO2: 99%   Weight: 189 lb (85.7 kg)   Height: 5' 11\" (1.803 m)       Physical Exam  Vitals reviewed. Constitutional:       Appearance: He is well-developed. HENT:      Head: Normocephalic and atraumatic. Eyes:      Conjunctiva/sclera: Conjunctivae normal.      Pupils: Pupils are equal, round, and reactive to light. Neck:      Vascular: No JVD. Cardiovascular:      Rate and Rhythm: Normal rate and regular rhythm. Pulmonary:      Effort: Pulmonary effort is normal.      Breath sounds: Normal breath sounds. Abdominal:      General: Bowel sounds are normal.      Palpations: Abdomen is soft. Musculoskeletal:         General: Normal range of motion.    Skin:     General: Skin is warm Please take your medications as prescribed.  I recommend you follow-up with urology at your earliest convenience.  If you develop uncontrollable vomiting, fevers, worsening pain, or other worrisome symptoms, return to the ER.   implantation of automatic cardioverter/defibrillator (AICD) 05/14/2018     Overview Note:     Dr Felix Franz 5/8/18          Diagnosis:   1. Primary hypertension  Assessment & Plan:   Well-controlled, continue current medications     Toprol 50 qd  Quinapril 40 mg qd    2. Hypothyroidism, unspecified type  Assessment & Plan:   Labs are pending  Levothyroxine 50 mcg daily  Orders:  -     levothyroxine (SYNTHROID) 50 MCG tablet; Take 1 tablet by mouth daily, Disp-90 tablet, R-1Normal  3. Chronic systolic congestive heart failure (HCC)  Assessment & Plan:   Asymptomatic, continue current medications     Quinapril 40 qd  Metoprolol Succ 50 mg qd   4. Cardiomyopathy, unspecified type ([address]  5. S/P implantation of automatic cardioverter/defibrillator (AICD)         PLAN:       RF is sent    Will try to get reports from [facility]    Adv to bring copy from [place]    Pt is stable on current medical treatment. Continue current treatment plan    Side effects/Adverse effects/Precautions are reviewed with patient. Low salt, Low Carb diet an low fat diet  Continue medications as advised and take them regularly  Regular exercises as advised    Discussed natural and expected course of this diagnosis and need to alert me if symptoms do not follow expected course, or if any worse. Smoking cessation if applicable, discussed with patient. There are no Patient Instructions on file for this visit.     Return in about 23 weeks (around 5/10/2023) for Annual Medicare Wellness Exam.

## 2025-02-09 NOTE — ED PROVIDER NOTES
"EMERGENCY DEPARTMENT ENCOUNTER      Pt Name: Suzie Avila  MRN: [id number]  Birthdate 1980  Date of evaluation: 2/8/2025  Provider: Evert Bain MD    CHIEF COMPLAINT       Chief Complaint   Patient presents with    Flank Pain     Right sided, and wraps around to the abdomen. Pt states, \"I have been able to get rid of my kidney stones.\"         HISTORY OF PRESENT ILLNESS    HPI  Patient is a 44-year-old female with a history of kidney stones, hypothyroidism presenting with bilateral flank pain.  She has had issues intermittently with kidney stones since 2023.  She has seen urology multiple times and had stents placed and other procedures.  The pain has never fully went away.  However today became unbearable.  Pain is 10/10, sharp, nonradiating, constant, without consistent alleviating or exacerbating factors.  She notes profound nausea without vomiting.    Nursing Notes were reviewed.    PAST MEDICAL HISTORY     Past Medical History:   Diagnosis Date    Anemia     Anxiety     Bilateral renal stones     Depression     Postural orthostatic tachycardia syndrome (POTS)     Seasonal affective disorder ([facility])     Vitamin B12 deficiency     Vitamin D deficiency          SURGICAL HISTORY     History reviewed. No pertinent surgical history.      CURRENT MEDICATIONS       Discharge Medication List as of 2/9/2025  1:44 AM          ALLERGIES     Gabapentin, Morphine, and Topiramate    FAMILY HISTORY     No family history on file.       SOCIAL HISTORY       Social History     Socioeconomic History    Marital status:    Tobacco Use    Smoking status: Every Day     Current packs/day: 1.00     Types: Cigarettes    Smokeless tobacco: Never   Vaping Use    Vaping status: Never Used   Substance and Sexual Activity    Alcohol use: Never    Drug use: Never       SCREENINGS                        PHYSICAL EXAM    (up to 7 for level 4, 8 or more for level 5)     ED Triage Vitals [02/08/25 2120]   Temperature Heart Rate " Respirations BP   36.6 °C (97.9 °F) 85 18 140/86      Pulse Ox Temp Source Heart Rate Source Patient Position   100 % Temporal Monitor Sitting      BP Location FiO2 (%)     Right arm --       Physical Exam  Constitutional:       Appearance: She is not ill-appearing.      Comments: Tearful, appears uncomfortable   HENT:      Head: Normocephalic and atraumatic.      Nose: Nose normal.      Mouth/Throat:      Mouth: Mucous membranes are moist.      Pharynx: Oropharynx is clear.   Eyes:      Extraocular Movements: Extraocular movements intact.      Conjunctiva/sclera: Conjunctivae normal.   Cardiovascular:      Rate and Rhythm: Normal rate and regular rhythm.      Heart sounds: Normal heart sounds.   Pulmonary:      Effort: Pulmonary effort is normal. No respiratory distress.      Breath sounds: Normal breath sounds.   Abdominal:      General: There is no distension.      Palpations: Abdomen is soft.      Tenderness: There is no abdominal tenderness. There is right CVA tenderness and left CVA tenderness.   Musculoskeletal:         General: No deformity or signs of injury.      Cervical back: Normal range of motion and neck supple.      Right lower leg: No edema.      Left lower leg: No edema.   Skin:     General: Skin is warm and dry.      Capillary Refill: Capillary refill takes less than 2 seconds.   Neurological:      General: No focal deficit present.          DIAGNOSTIC RESULTS     LABS:  Labs Reviewed   URINALYSIS WITH REFLEX CULTURE AND MICROSCOPIC - Abnormal       Result Value    Color, Urine Light-Orange (*)     Appearance, Urine Turbid (*)     Specific Gravity, Urine 1.024      pH, Urine 6.0      Protein, Urine 50 (1+) (*)     Glucose, Urine Normal      Blood, Urine OVER (3+) (*)     Ketones, Urine NEGATIVE      Bilirubin, Urine NEGATIVE      Urobilinogen, Urine Normal      Nitrite, Urine NEGATIVE      Leukocyte Esterase, Urine 250 Stephanie/uL (*)     Narrative:     OVER is reported when the result is greater than  the clinically reportable range.   CBC WITH AUTO DIFFERENTIAL - Abnormal    WBC 7.6      nRBC 0.0      RBC 4.62      Hemoglobin 12.3      Hematocrit 40.0      MCV 87      MCH 26.6      MCHC 30.8 (*)     RDW 18.5 (*)     Platelets 252      Neutrophils % 66.4      Immature Granulocytes %, Automated 0.3      Lymphocytes % 22.0      Monocytes % 8.2      Eosinophils % 2.8      Basophils % 0.3      Neutrophils Absolute 5.04      Immature Granulocytes Absolute, Automated 0.02      Lymphocytes Absolute 1.67      Monocytes Absolute 0.62      Eosinophils Absolute 0.21      Basophils Absolute 0.02     COMPREHENSIVE METABOLIC PANEL - Abnormal    Glucose 87      Sodium 135 (*)     Potassium 3.9      Chloride 100      Bicarbonate 27      Anion Gap 12      Urea Nitrogen 11      Creatinine 0.80      eGFR >90      Calcium 9.1      Albumin 4.7      Alkaline Phosphatase 70      Total Protein 7.8      AST 11      Bilirubin, Total 0.2      ALT 9     MICROSCOPIC ONLY, URINE - Abnormal    WBC, Urine >50 (*)     RBC, Urine >20 (*)     Squamous Epithelial Cells, Urine 1-9 (SPARSE)      Bacteria, Urine 1+ (*)    LIPASE - Normal    Lipase 10      Narrative:     Venipuncture immediately after or during the administration of Metamizole may lead to falsely low results. Testing should be performed immediately prior to Metamizole dosing.   HUMAN CHORIONIC GONADOTROPIN, SERUM QUANTITATIVE - Normal    HCG, Beta-Quantitative <2      Narrative:      Total HCG measurement is performed using the [employer]   Immunoassay which detects intact HCG and free beta HCG subunit.    This test is not indicated for use as a tumor marker.   HCG testing is performed using a different test methodology at [facility] than other [facility]. Direct result comparison   should only be made within the same method.       URINE CULTURE   URINALYSIS WITH REFLEX CULTURE AND MICROSCOPIC    Narrative:     The following orders were created for  panel order Urinalysis with Reflex Culture and Microscopic.  Procedure                               Abnormality         Status                     ---------                               -----------         ------                     Urinalysis with Reflex C...[[id number]]  Abnormal            Final result               Extra Urine Gray Tube[[id number]]                            In process                   Please view results for these tests on the individual orders.   EXTRA URINE GRAY TUBE       All other labs were within normal range or not returned as of this dictation.    Imaging  CT abdomen pelvis w IV contrast   Final Result   1.  Bilateral intrarenal calculi. No hydronephrosis or obstructive   nephrolithiasis. Additional chronic findings as described above.             Signed by: Kelby Ness 2/9/2025 12:25 AM   Dictation workstation:   [id number]           Procedures  Procedures     EMERGENCY DEPARTMENT COURSE/MDM:     ED Course as of 02/09/25 0240   Sun Feb 09, 2025   0131 This is a 44 years old female patient presented to the emergency department with bilateral flank pain as well as urgency and frequency of urination.  Denies any fever, chills,.  Stated that pain, diarrhea, constipation.  Reported nausea but denied vomiting.    Review of system: As stated above in the HPI section.    Physical exam revealed a 44 years old female patient with does not appear to be in acute distress  Cardiopulmonary exam with normal S1, S2, no gallop, or murmur, no wheezes, rales, rhonchi, or any signs of respiratory distress  Abdominal exam: Abdomen soft, nondistended, nontender, no guarding, rigidity, rebound.  Positive CVA tenderness on percussion bilaterally.  Neurologic exam: Alert and oriented x 4, no acute neurologic deficit.    Workup is unremarkable except for urinary tract infection.  Patient reported improvement after she received Dilaudid, ketorolac, Zofran, and also received Rocephin for a concern of  pyelonephritis.  Feeling very comfortable to be discharged home.  Does not appear septic or toxic looking and vital signs were reviewed and are unremarkable.  Patient is discharged home on oral antibiotic for the concern of pyelonephritis and to return to the emergency department if alarming symptoms arise specially worsening symptoms/urinary symptoms, worsening flank pain, nausea, vomiting, or if concerns arise.  Also discharged home on Zofran [ME]   0136 Patient be discharged home on Bactrim, and Zofran. [ME]      ED Course User Index  [ME] Fred Hobson DO         Diagnoses as of 02/09/25 0240   Flank pain   Pyelonephritis   Nausea   Bilateral kidney stones        Medical Decision Making  History obtained from the patient.  Records including labs, imaging, notes independently reviewed by me.  Presentation concerning for possible urinary tract infection, kidney stone, musculoskeletal pain.  Patient given Toradol, Zofran with improvement in her nausea but no improvement of her pain.  She was subsequently given Dilaudid with significant improvement in her pain.  Labs were sent.  CBC unremarkable.  CMP with mild hyponatremia 135 thought to be clinically noncontributory but otherwise unremarkable.  hCG and lipase within normal limits.  Urinalysis with 250 leukocyte esterase, large amount of white and red cells consistent with a urinary tract infection.  Given the bilateral CVA tenderness, patient treated [place] for pyelonephritis with 2 g of Rocephin.  CT of the abdomen pelvis demonstrated nonobstructing bilateral stones only.  Patient comfortable going home.  She requested referral to a new urologist which was accommodated.  Patient discharged home in satisfactory condition with antibiotics.  All questions answered and return precautions discussed.    Patient and or family in agreement and understanding of treatment plan.  All questions answered.      I reviewed the case with the attending ED physician. The  attending ED physician agrees with the plan. Patient and/or patient´s representative was counseled regarding labs, imaging, likely diagnosis, and plan. All questions were answered.    ED Medications administered this visit:    Medications   cefTRIAXone (Rocephin) 2 g in sodium chloride 0.9% IV 50 mL (0 g intravenous Stopped 2/8/25 2319)   ondansetron (Zofran) injection 4 mg (4 mg intravenous Given 2/8/25 2245)   ketorolac (Toradol) injection 15 mg (15 mg intravenous Given 2/8/25 2246)   iohexol (OMNIPaque) 350 mg iodine/mL solution 75 mL (75 mL intravenous Given 2/8/25 2329)   HYDROmorphone (Dilaudid) injection 0.5 mg (0.5 mg intravenous Given 2/9/25 0041)       New Prescriptions from this visit:    Discharge Medication List as of 2/9/2025  1:44 AM        START taking these medications    Details   ondansetron ODT (Zofran-ODT) 4 mg disintegrating tablet Dissolve 1 tablet (4 mg) in the mouth every 8 hours if needed for nausea or vomiting for up to 15 doses., Starting Sun 2/9/2025, Normal      oxyCODONE-acetaminophen (Percocet) 5-325 mg tablet Take 1 tablet by mouth every 6 hours if needed for severe pain (7 - 10) for up to 12 doses., Starting Sun 2/9/2025, Normal      sulfamethoxazole-trimethoprim (Bactrim DS) 800-160 mg tablet Take 1 tablet by mouth 2 times a day for 14 days., Starting Sun 2/9/2025, Until Sun 2/23/2025, Normal             Follow-up:  Tim Kaiser MD  [id number] [place] [address] [phone]              Final Impression:   1. Flank pain    2. Pyelonephritis    3. Nausea    4. Bilateral kidney stones          (Please note that portions of this note were completed with a voice recognition program.  Efforts were made to edit the dictations but occasionally words are mis-transcribed.)     Evert Bain MD  Resident  02/09/25 0053

## 2025-02-10 LAB — BACTERIA UR CULT: NORMAL

## 2025-02-25 ENCOUNTER — APPOINTMENT (OUTPATIENT)
Dept: RHEUMATOLOGY | Facility: CLINIC | Age: 45
End: 2025-02-25
Payer: MEDICAID

## 2025-02-25 ENCOUNTER — APPOINTMENT (OUTPATIENT)
Dept: RADIOLOGY | Facility: HOSPITAL | Age: 45
End: 2025-02-25
Payer: MEDICAID

## 2025-02-25 ENCOUNTER — HOSPITAL ENCOUNTER (EMERGENCY)
Facility: HOSPITAL | Age: 45
Discharge: HOME | End: 2025-02-25
Attending: EMERGENCY MEDICINE
Payer: MEDICAID

## 2025-02-25 ENCOUNTER — APPOINTMENT (OUTPATIENT)
Dept: CARDIOLOGY | Facility: HOSPITAL | Age: 45
End: 2025-02-25
Payer: MEDICAID

## 2025-02-25 VITALS
OXYGEN SATURATION: 100 % | BODY MASS INDEX: 33.07 KG/M2 | DIASTOLIC BLOOD PRESSURE: 61 MMHG | HEIGHT: 70 IN | SYSTOLIC BLOOD PRESSURE: 111 MMHG | HEART RATE: 63 BPM | RESPIRATION RATE: 16 BRPM | WEIGHT: 231 LBS | TEMPERATURE: 97.7 F

## 2025-02-25 DIAGNOSIS — R10.84 GENERALIZED ABDOMINAL PAIN: Primary | ICD-10-CM

## 2025-02-25 LAB
ALBUMIN SERPL BCP-MCNC: 3.6 G/DL (ref 3.4–5)
ALP SERPL-CCNC: 124 U/L (ref 33–110)
ALT SERPL W P-5'-P-CCNC: 17 U/L (ref 7–45)
ANION GAP SERPL CALC-SCNC: 12 MMOL/L (ref 10–20)
APPEARANCE UR: ABNORMAL
AST SERPL W P-5'-P-CCNC: 21 U/L (ref 9–39)
BACTERIA #/AREA URNS AUTO: ABNORMAL /HPF
BASOPHILS # BLD MANUAL: 0 X10*3/UL (ref 0–0.1)
BASOPHILS NFR BLD MANUAL: 0 %
BILIRUB SERPL-MCNC: 0.3 MG/DL (ref 0–1.2)
BILIRUB UR STRIP.AUTO-MCNC: NEGATIVE MG/DL
BNP SERPL-MCNC: 22 PG/ML (ref 0–99)
BUN SERPL-MCNC: 8 MG/DL (ref 6–23)
CALCIUM SERPL-MCNC: 8.5 MG/DL (ref 8.6–10.3)
CAOX CRY #/AREA UR COMP ASSIST: ABNORMAL /HPF
CARDIAC TROPONIN I PNL SERPL HS: <3 NG/L (ref 0–13)
CARDIAC TROPONIN I PNL SERPL HS: <3 NG/L (ref 0–13)
CHLORIDE SERPL-SCNC: 104 MMOL/L (ref 98–107)
CO2 SERPL-SCNC: 23 MMOL/L (ref 21–32)
COLOR UR: YELLOW
CREAT SERPL-MCNC: 0.8 MG/DL (ref 0.5–1.05)
DACRYOCYTES BLD QL SMEAR: ABNORMAL
EGFRCR SERPLBLD CKD-EPI 2021: >90 ML/MIN/1.73M*2
EOSINOPHIL # BLD MANUAL: 0.07 X10*3/UL (ref 0–0.7)
EOSINOPHIL NFR BLD MANUAL: 1 %
ERYTHROCYTE [DISTWIDTH] IN BLOOD BY AUTOMATED COUNT: 20.2 % (ref 11.5–14.5)
GIANT PLATELETS BLD QL SMEAR: ABNORMAL
GLUCOSE SERPL-MCNC: 86 MG/DL (ref 74–99)
GLUCOSE UR STRIP.AUTO-MCNC: NORMAL MG/DL
HCT VFR BLD AUTO: 37.5 % (ref 36–46)
HGB BLD-MCNC: 11.6 G/DL (ref 12–16)
IMM GRANULOCYTES # BLD AUTO: 0.04 X10*3/UL (ref 0–0.7)
IMM GRANULOCYTES NFR BLD AUTO: 0.6 % (ref 0–0.9)
INR PPP: 1 (ref 0.9–1.1)
KETONES UR STRIP.AUTO-MCNC: NEGATIVE MG/DL
LEUKOCYTE ESTERASE UR QL STRIP.AUTO: ABNORMAL
LYMPHOCYTES # BLD MANUAL: 2.7 X10*3/UL (ref 1.2–4.8)
LYMPHOCYTES NFR BLD MANUAL: 38 %
MCH RBC QN AUTO: 27.3 PG (ref 26–34)
MCHC RBC AUTO-ENTMCNC: 30.9 G/DL (ref 32–36)
MCV RBC AUTO: 88 FL (ref 80–100)
MONOCYTES # BLD MANUAL: 0.36 X10*3/UL (ref 0.1–1)
MONOCYTES NFR BLD MANUAL: 5 %
MUCOUS THREADS #/AREA URNS AUTO: ABNORMAL /LPF
NEUTROPHILS # BLD MANUAL: 3.41 X10*3/UL (ref 1.2–7.7)
NEUTS BAND # BLD MANUAL: 0.5 X10*3/UL (ref 0–0.7)
NEUTS BAND NFR BLD MANUAL: 7 %
NEUTS SEG # BLD MANUAL: 2.91 X10*3/UL (ref 1.2–7)
NEUTS SEG NFR BLD MANUAL: 41 %
NITRITE UR QL STRIP.AUTO: NEGATIVE
NRBC BLD-RTO: 0 /100 WBCS (ref 0–0)
OVALOCYTES BLD QL SMEAR: ABNORMAL
PH UR STRIP.AUTO: 6 [PH]
PLATELET # BLD AUTO: 149 X10*3/UL (ref 150–450)
POLYCHROMASIA BLD QL SMEAR: ABNORMAL
POTASSIUM SERPL-SCNC: 4.2 MMOL/L (ref 3.5–5.3)
PROT SERPL-MCNC: 6.7 G/DL (ref 6.4–8.2)
PROT UR STRIP.AUTO-MCNC: ABNORMAL MG/DL
PROTHROMBIN TIME: 10.9 SECONDS (ref 9.8–12.4)
RBC # BLD AUTO: 4.25 X10*6/UL (ref 4–5.2)
RBC # UR STRIP.AUTO: NEGATIVE MG/DL
RBC #/AREA URNS AUTO: ABNORMAL /HPF
RBC MORPH BLD: ABNORMAL
SODIUM SERPL-SCNC: 135 MMOL/L (ref 136–145)
SP GR UR STRIP.AUTO: 1.03
SQUAMOUS #/AREA URNS AUTO: ABNORMAL /HPF
TOTAL CELLS COUNTED BLD: 100
UROBILINOGEN UR STRIP.AUTO-MCNC: NORMAL MG/DL
VARIANT LYMPHS # BLD MANUAL: 0.57 X10*3/UL (ref 0–0.5)
VARIANT LYMPHS NFR BLD: 8 %
WBC # BLD AUTO: 7.1 X10*3/UL (ref 4.4–11.3)
WBC #/AREA URNS AUTO: ABNORMAL /HPF

## 2025-02-25 PROCEDURE — 85027 COMPLETE CBC AUTOMATED: CPT | Performed by: EMERGENCY MEDICINE

## 2025-02-25 PROCEDURE — 85007 BL SMEAR W/DIFF WBC COUNT: CPT | Performed by: EMERGENCY MEDICINE

## 2025-02-25 PROCEDURE — 36415 COLL VENOUS BLD VENIPUNCTURE: CPT | Performed by: EMERGENCY MEDICINE

## 2025-02-25 PROCEDURE — 80053 COMPREHEN METABOLIC PANEL: CPT | Performed by: EMERGENCY MEDICINE

## 2025-02-25 PROCEDURE — 87086 URINE CULTURE/COLONY COUNT: CPT | Mod: STJLAB

## 2025-02-25 PROCEDURE — 81001 URINALYSIS AUTO W/SCOPE: CPT

## 2025-02-25 PROCEDURE — 2500000004 HC RX 250 GENERAL PHARMACY W/ HCPCS (ALT 636 FOR OP/ED): Performed by: EMERGENCY MEDICINE

## 2025-02-25 PROCEDURE — 96375 TX/PRO/DX INJ NEW DRUG ADDON: CPT

## 2025-02-25 PROCEDURE — 71045 X-RAY EXAM CHEST 1 VIEW: CPT | Mod: FY

## 2025-02-25 PROCEDURE — 84484 ASSAY OF TROPONIN QUANT: CPT | Performed by: EMERGENCY MEDICINE

## 2025-02-25 PROCEDURE — 96361 HYDRATE IV INFUSION ADD-ON: CPT

## 2025-02-25 PROCEDURE — 85610 PROTHROMBIN TIME: CPT | Performed by: EMERGENCY MEDICINE

## 2025-02-25 PROCEDURE — 74177 CT ABD & PELVIS W/CONTRAST: CPT

## 2025-02-25 PROCEDURE — 99285 EMERGENCY DEPT VISIT HI MDM: CPT | Mod: 25 | Performed by: EMERGENCY MEDICINE

## 2025-02-25 PROCEDURE — 96374 THER/PROPH/DIAG INJ IV PUSH: CPT

## 2025-02-25 PROCEDURE — 71045 X-RAY EXAM CHEST 1 VIEW: CPT | Mod: COMPUTED RADIOGRAPHY X-RAY | Performed by: RADIOLOGY

## 2025-02-25 PROCEDURE — 93005 ELECTROCARDIOGRAM TRACING: CPT

## 2025-02-25 PROCEDURE — 83880 ASSAY OF NATRIURETIC PEPTIDE: CPT | Performed by: EMERGENCY MEDICINE

## 2025-02-25 PROCEDURE — 2550000001 HC RX 255 CONTRASTS: Performed by: EMERGENCY MEDICINE

## 2025-02-25 RX ORDER — METOCLOPRAMIDE HYDROCHLORIDE 5 MG/ML
5 INJECTION INTRAMUSCULAR; INTRAVENOUS ONCE
Status: COMPLETED | OUTPATIENT
Start: 2025-02-25 | End: 2025-02-25

## 2025-02-25 RX ADMIN — IOHEXOL 75 ML: 350 INJECTION, SOLUTION INTRAVENOUS at 21:22

## 2025-02-25 RX ADMIN — SODIUM CHLORIDE, POTASSIUM CHLORIDE, SODIUM LACTATE AND CALCIUM CHLORIDE 1000 ML: 600; 310; 30; 20 INJECTION, SOLUTION INTRAVENOUS at 21:55

## 2025-02-25 RX ADMIN — METOCLOPRAMIDE 5 MG: 5 INJECTION, SOLUTION INTRAMUSCULAR; INTRAVENOUS at 21:54

## 2025-02-25 RX ADMIN — HYDROMORPHONE HYDROCHLORIDE 0.5 MG: 1 INJECTION, SOLUTION INTRAMUSCULAR; INTRAVENOUS; SUBCUTANEOUS at 21:54

## 2025-02-25 ASSESSMENT — LIFESTYLE VARIABLES
EVER FELT BAD OR GUILTY ABOUT YOUR DRINKING: NO
TOTAL SCORE: 0
HAVE PEOPLE ANNOYED YOU BY CRITICIZING YOUR DRINKING: NO
HAVE YOU EVER FELT YOU SHOULD CUT DOWN ON YOUR DRINKING: NO
EVER HAD A DRINK FIRST THING IN THE MORNING TO STEADY YOUR NERVES TO GET RID OF A HANGOVER: NO

## 2025-02-25 ASSESSMENT — PAIN DESCRIPTION - LOCATION
LOCATION_3: OTHER (COMMENT)
LOCATION: BACK
LOCATION_2: ABDOMEN

## 2025-02-25 ASSESSMENT — PAIN SCALES - GENERAL
PAINLEVEL_OUTOF10: 10 - WORST POSSIBLE PAIN

## 2025-02-25 ASSESSMENT — PAIN DESCRIPTION - DESCRIPTORS
DESCRIPTORS_2: CRAMPING
DESCRIPTORS: CRAMPING

## 2025-02-25 ASSESSMENT — PAIN DESCRIPTION - PAIN TYPE: TYPE: ACUTE PAIN

## 2025-02-25 ASSESSMENT — PAIN DESCRIPTION - ORIENTATION: ORIENTATION: LOWER

## 2025-02-25 ASSESSMENT — COLUMBIA-SUICIDE SEVERITY RATING SCALE - C-SSRS
6. HAVE YOU EVER DONE ANYTHING, STARTED TO DO ANYTHING, OR PREPARED TO DO ANYTHING TO END YOUR LIFE?: NO
2. HAVE YOU ACTUALLY HAD ANY THOUGHTS OF KILLING YOURSELF?: NO
1. IN THE PAST MONTH, HAVE YOU WISHED YOU WERE DEAD OR WISHED YOU COULD GO TO SLEEP AND NOT WAKE UP?: NO

## 2025-02-25 ASSESSMENT — PAIN - FUNCTIONAL ASSESSMENT: PAIN_FUNCTIONAL_ASSESSMENT: 0-10

## 2025-02-26 LAB
ATRIAL RATE: 68 BPM
HOLD SPECIMEN: NORMAL
P AXIS: 55 DEGREES
P OFFSET: 196 MS
P ONSET: 141 MS
PR INTERVAL: 166 MS
Q ONSET: 224 MS
QRS COUNT: 11 BEATS
QRS DURATION: 94 MS
QT INTERVAL: 408 MS
QTC CALCULATION(BAZETT): 433 MS
QTC FREDERICIA: 425 MS
R AXIS: 27 DEGREES
T AXIS: -20 DEGREES
T OFFSET: 428 MS
VENTRICULAR RATE: 68 BPM

## 2025-02-26 NOTE — ED PROVIDER NOTES
"Emergency Department Provider Note        History of Present Illness     History provided by: Patient  Limitations to History: None  External Records Reviewed with Brief Summary: None    HPI:  Suzie Avila is a 44 y.o. female presenting to the emergency department with abdominal pain and right-sided back pain.  Patient has had intermittent pains that been present for past several months.  She states that nothing is particularly worse tonight, she is just \"tired of the pain.\"  She has had nausea without vomiting.  She states that she has been alternating diarrhea and constipation which is not atypical for her.  She denies any blood in the stool or black or tarry stools.  She denies any fevers or chills.  No chest pain or shortness of breath.  She states she was having some dysuria about a week ago, she was on Bactrim her symptoms have resolved.  No blood in the urine, urinary habits are otherwise baseline for her.    Physical Exam   Triage vitals:  T 36.5 °C (97.7 °F)  HR 72  /59  RR 18  O2 97 % None (Room air)    Physical Exam  Vitals and nursing note reviewed.   Constitutional:       General: She is not in acute distress.     Appearance: She is obese. She is not ill-appearing or toxic-appearing.   HENT:      Head: Normocephalic and atraumatic.      Mouth/Throat:      Mouth: Mucous membranes are moist.   Eyes:      Extraocular Movements: Extraocular movements intact.   Cardiovascular:      Rate and Rhythm: Normal rate and regular rhythm.   Pulmonary:      Effort: Pulmonary effort is normal.      Breath sounds: Normal breath sounds.   Abdominal:      Palpations: Abdomen is soft.      Tenderness: There is abdominal tenderness (Mild diffuse abdominal pain with palpation). There is no guarding or rebound.   Musculoskeletal:         General: Normal range of motion.      Right lower leg: No edema.      Left lower leg: No edema.   Skin:     General: Skin is warm.      Capillary Refill: Capillary refill takes " less than 2 seconds.   Neurological:      General: No focal deficit present.      Mental Status: She is alert and oriented to person, place, and time.      Motor: No weakness.   Psychiatric:         Mood and Affect: Mood normal.         Behavior: Behavior normal.          Medical Decision Making & ED Course   Medical Decision Makin y.o. female presenting to the emergency department with abdominal pain.  She states that she has been having chronic abdominal pain for past few months that has worsened over the last couple of days.  She has nausea without vomiting.  On arrival she is afebrile and hemodynamically stable.  Does have diffuse abdominal pain on examination with no rebound or guarding.    IV established and labs all reviewed.  No leukocytosis.  Urinalysis does have 1+ bacteria, but does have some squamous cells as well I suspect most likely contaminated will send for culture but not treat as patient is not having symptoms consistent with UTI at this point.    CT scan was performed which is negative for any acute abnormality in the abdomen pelvis.  She does have intrarenal calculi which is known.  X-ray is negative for pneumothorax pneumonia wide mediastinum or any other acute cardiopulmonary abnormality.    I feel that her symptomatology is most likely worsening of her chronic abdominal pain.  I think she is appropriate for discharge and outpatient management.  She is feeling better, she is tolerating oral intake.  I think she is appropriate for discharge and outpatient management.  She is given strict return precautions.  She understands agrees stated plan is discharged home at this time.  ----     Social Determinants of Health which Significantly Impact Care: None identified     EKG Independent Interpretation:  Normal sinus rhythm with ventricular rate of 68 bpm.  Normal axis.  T wave inversions noted in the anterior lateral precordial leads.  EKG is unchanged compared to EKG performed both in July  2023 and January 2009.  No acute ischemic changes or injury pattern is present.    Independent Result Review and Interpretation: Relevant laboratory and radiographic results were reviewed and independently interpreted by myself.  As necessary, they are commented on in the ED Course.    Chronic conditions affecting the patient's care: As documented above in [facility]    The patient was discussed with the following consultants/services: None    Care Considerations: As documented above in [facility]    ED Course:  Diagnoses as of 02/25/25 2231   Generalized abdominal pain     Disposition   As a result of the work-up, the patient was discharged home.  she was informed of her diagnosis and instructed to come back with any concerns or worsening of condition.  she and was agreeable to the plan as discussed above.  she was given the opportunity to ask questions.  All of the patient's questions were answered.    Procedures   Procedures    Patient was seen independently    Tim Low DO  Emergency Medicine     Tim Low DO  02/25/25 2235

## 2025-02-27 LAB — BACTERIA UR CULT: NORMAL

## 2025-03-19 ENCOUNTER — LAB (OUTPATIENT)
Dept: LAB | Facility: HOSPITAL | Age: 45
End: 2025-03-19
Payer: MEDICAID

## 2025-03-19 ENCOUNTER — APPOINTMENT (OUTPATIENT)
Facility: CLINIC | Age: 45
End: 2025-03-19
Payer: MEDICAID

## 2025-03-19 VITALS
DIASTOLIC BLOOD PRESSURE: 73 MMHG | HEART RATE: 79 BPM | SYSTOLIC BLOOD PRESSURE: 105 MMHG | HEIGHT: 70 IN | BODY MASS INDEX: 33.5 KG/M2 | TEMPERATURE: 97.5 F | WEIGHT: 234 LBS

## 2025-03-19 DIAGNOSIS — M35.00 SICCA, UNSPECIFIED TYPE (MULTI): Primary | ICD-10-CM

## 2025-03-19 DIAGNOSIS — M25.542 ARTHRALGIA OF BOTH HANDS: ICD-10-CM

## 2025-03-19 DIAGNOSIS — M25.541 ARTHRALGIA OF BOTH HANDS: ICD-10-CM

## 2025-03-19 LAB — PROT SERPL-MCNC: 6.5 G/DL (ref 6.4–8.2)

## 2025-03-19 PROCEDURE — 84165 PROTEIN E-PHORESIS SERUM: CPT

## 2025-03-19 PROCEDURE — 86334 IMMUNOFIX E-PHORESIS SERUM: CPT

## 2025-03-19 PROCEDURE — 99204 OFFICE O/P NEW MOD 45 MIN: CPT | Performed by: INTERNAL MEDICINE

## 2025-03-19 PROCEDURE — 3008F BODY MASS INDEX DOCD: CPT | Performed by: INTERNAL MEDICINE

## 2025-03-19 PROCEDURE — 84155 ASSAY OF PROTEIN SERUM: CPT

## 2025-03-19 RX ORDER — PSEUDOEPHEDRINE HCL 30 MG
30 TABLET ORAL EVERY 4 HOURS PRN
COMMUNITY

## 2025-03-19 RX ORDER — LORAZEPAM 1 MG/1
1 TABLET ORAL EVERY 6 HOURS PRN
COMMUNITY

## 2025-03-19 RX ORDER — BUTALBITAL, ACETAMINOPHEN AND CAFFEINE 50; 325; 40 MG/1; MG/1; MG/1
1 TABLET ORAL EVERY 4 HOURS PRN
COMMUNITY

## 2025-03-19 RX ORDER — FLUOXETINE HYDROCHLORIDE 40 MG/1
40 CAPSULE ORAL DAILY
COMMUNITY

## 2025-03-19 RX ORDER — ZOLMITRIPTAN 2.5 MG/1
2.5 TABLET, FILM COATED ORAL ONCE AS NEEDED
COMMUNITY

## 2025-03-19 RX ORDER — PROGESTERONE 100 MG/1
100 CAPSULE ORAL DAILY
COMMUNITY

## 2025-03-19 NOTE — PATIENT INSTRUCTIONS
Please do the blood tests    Use Xylimelts  4-5 times a day    Do the parotid massage as discussed     Follow up after blood tests and ultrasound parotids

## 2025-03-19 NOTE — PROGRESS NOTES
Subjective   Patient ID: [id number]   Suzie Avila is a 44 y.o. female who presents for Joint Pain.  HPI    History of Severe anemia, SAD, Hashimoto thyroiditis, Renal stones, POTS, Cervical spondylosis and Facet arthropathy ( CT Cervical 2021 is normal)  POTS -     Arthralgias - 1 year  Stiffness with some diurnal variation  Swelling of the hands intermittently  Difficlty making a fist, losing dexterity in the hands, sometimes drops things for this reason, at times feels hands are swollen  At times loses control of her ankles, when they go out on her and roll  Sicca symptoms - with excessive dryness and choking spells  Mostly with solids   Dental decay - early onset,   Feels tongue swells and feels burnt  Brtille hair , hair loss - no bald patches  Hair changing color  Gaining weight - 30-35 lbs in the last year  Brain fog - loss of memory    Anxiety + depression  On prozac and atavan  Not actively working  No family hx of AI diseases    ROS  10 system review otherwise neg unless aforementioned      Patient Active Problem List   Diagnosis    Anemia        Past Medical History:   Diagnosis Date    Anemia     Anxiety     Bilateral renal stones     Depression     Postural orthostatic tachycardia syndrome (POTS)     Seasonal affective disorder (CMS-HCC)     Vitamin B12 deficiency     Vitamin D deficiency         No past surgical history on file.     Social History     Socioeconomic History    Marital status:      Spouse name: Not on file    Number of children: Not on file    Years of education: Not on file    Highest education level: Not on file   Occupational History    Not on file   Tobacco Use    Smoking status: Every Day     Current packs/day: 1.00     Types: Cigarettes    Smokeless tobacco: Never   Vaping Use    Vaping status: Never Used   Substance and Sexual Activity    Alcohol use: Never    Drug use: Never    Sexual activity: Not on file   Other Topics Concern    Not on file   Social History Narrative     Not on file     Social Drivers of Health     Financial Resource Strain: Not on file   Food Insecurity: Not on file   Transportation Needs: Not on file   Physical Activity: Not on file   Stress: Not on file   Social Connections: Not on file   Intimate Partner Violence: Not on file   Housing Stability: Not on file        Allergies   Allergen Reactions    Gabapentin Swelling     Causes joints to swell     Morphine Other    Topiramate Other          Current Outpatient Medications:     ondansetron ODT (Zofran-ODT) 4 mg disintegrating tablet, Dissolve 1 tablet (4 mg) in the mouth every 8 hours if needed for nausea or vomiting for up to 15 doses., Disp: 15 tablet, Rfl: 0    butalbital-acetaminophen-caff -40 mg tablet, Take 1 tablet by mouth every 4 hours if needed for headaches., Disp: , Rfl:     FLUoxetine (PROzac) 40 mg capsule, Take 1 capsule (40 mg) by mouth once daily., Disp: , Rfl:     LORazepam (Ativan) 1 mg tablet, Take 1 tablet (1 mg) by mouth every 6 hours if needed for anxiety., Disp: , Rfl:     oxyCODONE-acetaminophen (Percocet) 5-325 mg tablet, Take 1 tablet by mouth every 6 hours if needed for severe pain (7 - 10) for up to 12 doses. (Patient not taking: Reported on 3/19/2025), Disp: 12 tablet, Rfl: 0    progesterone (Prometrium) 100 mg capsule, Take 1 capsule (100 mg) by mouth once daily., Disp: , Rfl:     pseudoephedrine (Sudafed) 30 mg tablet, Take 1 tablet (30 mg) by mouth every 4 hours if needed for congestion., Disp: , Rfl:     ZOLMitriptan (Zomig) 2.5 mg tablet, Take 1 tablet (2.5 mg) by mouth 1 time if needed for migraine. May repeat in 2 hours if unresolved. Do not exceed 10 mg in 24 hours., Disp: , Rfl:        Objective     Visit Vitals  /73   Pulse 79   Temp 36.4 °C (97.5 °F)        Physical Exam  General: AAOx3, Cooperative  Head: normocephalic, atraumatic  Eyes: EOMI, conjunctiva clear, sclera white, anicteric  Ears: no redness, swelling, tenderness  Nose: no deformity, no crusting    Throat/Mouth: No oral deformities, no cheek swelling, mucosa appear moist, no oral ulcers noted or loss of dentition   Neck/Lymph: FROM, trachea midline  Lungs: chest expansion symmetric. No respiratory distress.   Heart: RRR  Neuro: CN II-XII grossly intact, no focal deficit  Skin: No rashes, ulcers or photosensitive areas  MSK: Upper Extremities:  Hand/Fingers: No erythema, swelling, tenderness or warmth at DIP, PIP, or MCP joints, FROM grossly. Good hand . No nodules. No deformities   Wrists: No erythema, swelling, warmth or tenderness at wrist, FROM grossly  Elbows: No tenderness, swelling, erythema or warmth at elbows, FROM grossly. No nodules   Shoulders: No swelling, erythema, tenderness or warmth at shoulders. FROM  Lower Extremities:   Hips: No obvious deformities. No joint tenderness, normal ROM grossly. Log roll test negative bilaterally. Katerina test is negative bilaterally. No trochanteric bursae TTP  Knees: No tenderness, deformities, swelling, rashes, or warmth, normal ROM grossly. No crepitus, no pes anserine bursa TTP   Ankles: No deformities, tenderness, edema, erythema, ulceration, or warmth at the ankle  Feet: Negative MTP squeeze. Normal ROM grossly.   Spine: No spinal tenderness to palpation. No SI joint tenderness. Gaenslen test negative       [unfilled]      Lab Results   Component Value Date    WBC 7.1 02/25/2025    HGB 11.6 (L) 02/25/2025    HCT 37.5 02/25/2025    MCV 88 02/25/2025     (L) 02/25/2025        Chemistry    Lab Results   Component Value Date/Time     (L) 02/25/2025 1915    K 4.2 02/25/2025 1915     02/25/2025 1915    CO2 23 02/25/2025 1915    BUN 8 02/25/2025 1915    CREATININE 0.80 02/25/2025 1915    Lab Results   Component Value Date/Time    CALCIUM 8.5 (L) 02/25/2025 1915    ALKPHOS 124 (H) 02/25/2025 1915    AST 21 02/25/2025 1915    ALT 17 02/25/2025 1915    BILITOT 0.3 02/25/2025 1915           Lab Results   Component Value Date    CRP <0.10  "04/03/2024      Lab Results   Component Value Date    SEDRATE 34 (H) 04/03/2024      Lab Results   Component Value Date    CKTOTAL 33 07/25/2023     Lab Results   Component Value Date    NEUTROABS 5.04 02/08/2025      No results found for: \"FERRITIN\"   No results found for: \"HEPATOT\", \"HEPAIGM\", \"HEPBCIGM\", \"HEPBCAB\", \"HBEAG\", \"HEPCAB\"   Lab Results   Component Value Date    ALT 17 02/25/2025    AST 21 02/25/2025    ALKPHOS 124 (H) 02/25/2025    BILITOT 0.3 02/25/2025      No results found for: \"PPD\"   No results found for: \"URICACID\"   Lab Results   Component Value Date    CALCIUM 8.5 (L) 02/25/2025      No results found for: \"SPEP\", \"UPEP\"   No results found for: \"ALBUR\", \"[id number]\"   .last          ECG 12 lead  Normal sinus rhythm  T wave abnormality, consider anterolateral ischemia  Abnormal ECG  When compared with ECG of 27-JUL-2023 13:11,  Sinus rhythm has replaced Junctional rhythm  Vent. rate has increased BY  24 BPM     === 02/25/25 ===    XR CHEST 1 VIEW    - Impression -  1.  No evidence of acute cardiopulmonary process.        MACRO:  None    Signed by: Chris Alejandro 2/25/2025 9:02 PM  Dictation workstation:   [id number]             Assessment/Plan   Diagnoses and all orders for this visit:  Sicca, unspecified type (Multi)  Hx of sicca symptoms with early dental decay  and polyarthralgias  Prior diagnosis of POTS  Rule out pSS    No clinical evidence of inflammatory arthritis  By definition meets clinically suspect arthralgias- will check for other causes of inflammatory arthritis  Has risk factors for nociceptive pain as well ; will address next visit and refer to appropriate providers    -     HENNY with Reflex to TELLY; Future  -     C3 Complement; Future  -     C4 Complement; Future  -     Sedimentation Rate; Future  -     C-Reactive Protein; Future  -     Protein, Urine Random; Future  -     Creatinine, Urine Random; Future  -     Hepatitis C Antibody; Future  -     Hepatitis B Surface Antigen; " Future  -     US neck parotid; Future  -     Serum Protein Electrophoresis; Future  -     Follow Up In Rheumatology; Future  Arthralgia of both hands  -     Citrulline Antibody, IgG; Future  -     Rheumatoid Factor; Future  -     TSH with reflex to Free T4 if abnormal; Future  -     Follow Up In Rheumatology; Future         Binh Llamas MD FACR   of Medicine  [facility] - Department of Rheumatology  [facility]   Plan, including risks and benefits, was discussed with the patient, informed on how to reach us.     To schedule an appointment, call  [phone] Lily or call [phone] for [facility]

## 2025-03-20 LAB
ANA SER QL IF: NEGATIVE
C3 SERPL-MCNC: 163 MG/DL (ref 83–193)
C4 SERPL-MCNC: 32 MG/DL (ref 15–57)
CCP IGG SERPL-ACNC: <16 UNITS
CRP SERPL-MCNC: 4.6 MG/L
ERYTHROCYTE [SEDIMENTATION RATE] IN BLOOD BY WESTERGREN METHOD: 19 MM/H
HBV SURFACE AG SERPL QL IA: NORMAL
HCV AB SERPL QL IA: NORMAL
RHEUMATOID FACT SERPL-ACNC: <10 IU/ML
TSH SERPL-ACNC: 0.82 MIU/L

## 2025-03-21 ENCOUNTER — HOSPITAL ENCOUNTER (OUTPATIENT)
Dept: RADIOLOGY | Facility: CLINIC | Age: 45
Discharge: HOME | End: 2025-03-21
Payer: MEDICAID

## 2025-03-21 DIAGNOSIS — M35.00 SICCA, UNSPECIFIED TYPE (MULTI): ICD-10-CM

## 2025-03-21 PROCEDURE — 76536 US EXAM OF HEAD AND NECK: CPT | Performed by: STUDENT IN AN ORGANIZED HEALTH CARE EDUCATION/TRAINING PROGRAM

## 2025-03-21 PROCEDURE — 76536 US EXAM OF HEAD AND NECK: CPT

## 2025-03-22 LAB
ATRIAL RATE: 68 BPM
P AXIS: 55 DEGREES
P OFFSET: 196 MS
P ONSET: 141 MS
PR INTERVAL: 166 MS
Q ONSET: 224 MS
QRS COUNT: 11 BEATS
QRS DURATION: 94 MS
QT INTERVAL: 408 MS
QTC CALCULATION(BAZETT): 433 MS
QTC FREDERICIA: 425 MS
R AXIS: 27 DEGREES
T AXIS: -20 DEGREES
T OFFSET: 428 MS
VENTRICULAR RATE: 68 BPM

## 2025-03-24 LAB
ALBUMIN: 3.8 G/DL (ref 3.4–5)
ALPHA 1 GLOBULIN: 0.3 G/DL (ref 0.2–0.6)
ALPHA 2 GLOBULIN: 0.6 G/DL (ref 0.4–1.1)
BETA GLOBULIN: 0.8 G/DL (ref 0.5–1.2)
GAMMA GLOBULIN: 1 G/DL (ref 0.5–1.4)
IMMUNOFIXATION COMMENT: NORMAL
PATH REVIEW - SERUM IMMUNOFIXATION: NORMAL
PATH REVIEW-SERUM PROTEIN ELECTROPHORESIS: NORMAL
PROTEIN ELECTROPHORESIS COMMENT: NORMAL

## 2025-03-29 ENCOUNTER — APPOINTMENT (OUTPATIENT)
Dept: RADIOLOGY | Facility: HOSPITAL | Age: 45
End: 2025-03-29
Payer: MEDICAID

## 2025-03-29 ENCOUNTER — HOSPITAL ENCOUNTER (EMERGENCY)
Facility: HOSPITAL | Age: 45
Discharge: HOME | End: 2025-03-29
Attending: EMERGENCY MEDICINE
Payer: MEDICAID

## 2025-03-29 VITALS
DIASTOLIC BLOOD PRESSURE: 63 MMHG | SYSTOLIC BLOOD PRESSURE: 127 MMHG | WEIGHT: 215 LBS | HEART RATE: 65 BPM | RESPIRATION RATE: 16 BRPM | TEMPERATURE: 98.2 F | BODY MASS INDEX: 30.78 KG/M2 | HEIGHT: 70 IN | OXYGEN SATURATION: 98 %

## 2025-03-29 DIAGNOSIS — M25.561 ACUTE PAIN OF RIGHT KNEE: Primary | ICD-10-CM

## 2025-03-29 DIAGNOSIS — S93.401A SPRAIN OF RIGHT ANKLE, INITIAL ENCOUNTER: ICD-10-CM

## 2025-03-29 PROCEDURE — 2500000004 HC RX 250 GENERAL PHARMACY W/ HCPCS (ALT 636 FOR OP/ED)

## 2025-03-29 PROCEDURE — 73564 X-RAY EXAM KNEE 4 OR MORE: CPT | Mod: RIGHT SIDE | Performed by: STUDENT IN AN ORGANIZED HEALTH CARE EDUCATION/TRAINING PROGRAM

## 2025-03-29 PROCEDURE — 73610 X-RAY EXAM OF ANKLE: CPT | Mod: RT

## 2025-03-29 PROCEDURE — 96372 THER/PROPH/DIAG INJ SC/IM: CPT

## 2025-03-29 PROCEDURE — 73590 X-RAY EXAM OF LOWER LEG: CPT | Mod: RT

## 2025-03-29 PROCEDURE — 73630 X-RAY EXAM OF FOOT: CPT | Mod: RIGHT SIDE | Performed by: STUDENT IN AN ORGANIZED HEALTH CARE EDUCATION/TRAINING PROGRAM

## 2025-03-29 PROCEDURE — 73630 X-RAY EXAM OF FOOT: CPT | Mod: RT

## 2025-03-29 PROCEDURE — 99284 EMERGENCY DEPT VISIT MOD MDM: CPT | Performed by: EMERGENCY MEDICINE

## 2025-03-29 PROCEDURE — 73564 X-RAY EXAM KNEE 4 OR MORE: CPT | Mod: RT

## 2025-03-29 PROCEDURE — 73610 X-RAY EXAM OF ANKLE: CPT | Mod: RIGHT SIDE | Performed by: STUDENT IN AN ORGANIZED HEALTH CARE EDUCATION/TRAINING PROGRAM

## 2025-03-29 PROCEDURE — 73590 X-RAY EXAM OF LOWER LEG: CPT | Mod: RIGHT SIDE | Performed by: STUDENT IN AN ORGANIZED HEALTH CARE EDUCATION/TRAINING PROGRAM

## 2025-03-29 RX ORDER — KETOROLAC TROMETHAMINE 15 MG/ML
15 INJECTION, SOLUTION INTRAMUSCULAR; INTRAVENOUS ONCE
Status: COMPLETED | OUTPATIENT
Start: 2025-03-29 | End: 2025-03-29

## 2025-03-29 RX ADMIN — KETOROLAC TROMETHAMINE 15 MG: 15 INJECTION, SOLUTION INTRAMUSCULAR; INTRAVENOUS at 22:28

## 2025-03-29 ASSESSMENT — PAIN DESCRIPTION - LOCATION: LOCATION: ANKLE

## 2025-03-29 ASSESSMENT — PAIN SCALES - GENERAL: PAINLEVEL_OUTOF10: 8

## 2025-03-29 ASSESSMENT — LIFESTYLE VARIABLES
TOTAL SCORE: 0
EVER FELT BAD OR GUILTY ABOUT YOUR DRINKING: NO
HAVE PEOPLE ANNOYED YOU BY CRITICIZING YOUR DRINKING: NO
EVER HAD A DRINK FIRST THING IN THE MORNING TO STEADY YOUR NERVES TO GET RID OF A HANGOVER: NO
HAVE YOU EVER FELT YOU SHOULD CUT DOWN ON YOUR DRINKING: NO

## 2025-03-29 ASSESSMENT — PAIN DESCRIPTION - PAIN TYPE: TYPE: ACUTE PAIN

## 2025-03-29 ASSESSMENT — PAIN - FUNCTIONAL ASSESSMENT: PAIN_FUNCTIONAL_ASSESSMENT: 0-10

## 2025-03-29 ASSESSMENT — PAIN DESCRIPTION - ORIENTATION: ORIENTATION: RIGHT

## 2025-03-30 NOTE — ED PROVIDER NOTES
HPI   Chief Complaint   Patient presents with    Ankle Pain    Knee Pain     Pt presents to the ED with right behind the knee pain that started yesterday with no known injury and radiates down to the right ankle - pt complains of difficulty walking and pain has increased throughout the day today.       Patient is a 44-year-old female presenting to the ED for right lower extremity pain.  Patient reports that on Thursday night, she inverted her left foot, causing her to bear significant weight on her right lower extremity.  Patient reports that starting yesterday, she began having swelling of the right knee, especially in the posterior, as well as right shin pain, right ankle pain with tenderness over the outside of the right ankle.  Patient denies any other associated injuries.              Patient History   Past Medical History:   Diagnosis Date    Anemia     Anxiety     Bilateral renal stones     Depression     Postural orthostatic tachycardia syndrome (POTS)     Seasonal affective disorder     Vitamin B12 deficiency     Vitamin D deficiency      History reviewed. No pertinent surgical history.  No family history on file.  Social History     Tobacco Use    Smoking status: Every Day     Current packs/day: 1.00     Types: Cigarettes    Smokeless tobacco: Never   Vaping Use    Vaping status: Never Used   Substance Use Topics    Alcohol use: Never    Drug use: Never       Physical Exam   ED Triage Vitals [03/29/25 2122]   Temperature Heart Rate Respirations BP   36.8 °C (98.2 °F) 71 18 139/63      Pulse Ox Temp Source Heart Rate Source Patient Position   94 % Temporal Monitor Sitting      BP Location FiO2 (%)     Right arm --       Physical Exam  Constitutional:       Appearance: Normal appearance. She is obese.   HENT:      Head: Normocephalic and atraumatic.      Nose: Nose normal.      Mouth/Throat:      Mouth: Mucous membranes are moist.      Pharynx: Oropharynx is clear.   Eyes:      Extraocular Movements:  Extraocular movements intact.      Conjunctiva/sclera: Conjunctivae normal.      Pupils: Pupils are equal, round, and reactive to light.   Cardiovascular:      Rate and Rhythm: Normal rate and regular rhythm.      Pulses: Normal pulses.      Heart sounds: Normal heart sounds.   Pulmonary:      Effort: Pulmonary effort is normal.      Breath sounds: Normal breath sounds.   Abdominal:      General: Abdomen is flat. Bowel sounds are normal.      Palpations: Abdomen is soft.   Musculoskeletal:         General: Swelling and tenderness present.      Cervical back: Normal range of motion and neck supple.      Comments: Swelling of the right popliteal area.  Pain on palpation of the lateral and medial malleoli of the right ankle.   Skin:     General: Skin is warm and dry.      Capillary Refill: Capillary refill takes less than 2 seconds.   Neurological:      General: No focal deficit present.      Mental Status: She is alert and oriented to person, place, and time. Mental status is at baseline.   Psychiatric:         Mood and Affect: Mood normal.         Behavior: Behavior normal.           ED Course & MDM   Diagnoses as of 03/30/25 0000   Acute pain of right knee   Sprain of right ankle, initial encounter                 No data recorded     José Manuel Coma Scale Score: 15 (03/29/25 2126 : Dayna Bueno RN)                           Medical Decision Making  Patient is a 44 y.o. female who presents to [facility] ED for Ankle Pain and Knee Pain (Pt presents to the ED with right behind the knee pain that started yesterday with no known injury and radiates down to the right ankle - pt complains of difficulty walking and pain has increased throughout the day today.). On initial ED evaluation, patient found to be in no acute distress. Per HPI, concern to evaluate and treat for differential diagnosis including, but not limited to ankle sprain/fracture.  Obtaining appropriate x-ray imaging.  Examination revealed no underlying acute  traumatic injury or osseous abnormality.  Patient be placed in knee immobilizer for support.  Patient to follow-up with outpatient orthopedics.    Diagnostic findings and treatment plan discussed with patient/family. They are amenable to plan. Rx given for N/A. Patient to follow up with PCP, orthopedics outpatient,referrals provided. Anticipatory guidance and return precautions provided.  Patient otherwise stable for discharge.          Procedure  Procedures     Bárbara Patterson MD  Resident  03/30/25 0001      The patient was seen by the resident/fellow.  I have personally performed a substantive portion of the encounter.  I have seen and examined the patient; agree with the workup, evaluation, MDM, management and diagnosis.  The care plan has been discussed with the resident; I have reviewed the resident’s note and agree with the documented findings.                                                    Omero Ray,   03/30/25 0315

## 2025-03-30 NOTE — DISCHARGE INSTRUCTIONS
Please take Tylenol/Motrin as needed for pain and swelling.  Please follow-up with orthopedics in the next week.  Please return to closest ED for any worsening symptoms numbness/ting of the extremity, excessive swelling/pain, or pale/cool extremities.

## 2025-04-02 ENCOUNTER — OFFICE VISIT (OUTPATIENT)
Dept: ORTHOPEDIC SURGERY | Facility: CLINIC | Age: 45
End: 2025-04-02
Payer: MEDICAID

## 2025-04-02 DIAGNOSIS — S93.401A SPRAIN OF RIGHT ANKLE, INITIAL ENCOUNTER: ICD-10-CM

## 2025-04-02 DIAGNOSIS — M23.91 ACUTE INTERNAL DERANGEMENT OF RIGHT KNEE: Primary | ICD-10-CM

## 2025-04-02 DIAGNOSIS — M25.561 ACUTE PAIN OF RIGHT KNEE: ICD-10-CM

## 2025-04-02 PROCEDURE — 99214 OFFICE O/P EST MOD 30 MIN: CPT | Performed by: STUDENT IN AN ORGANIZED HEALTH CARE EDUCATION/TRAINING PROGRAM

## 2025-04-02 PROCEDURE — 99204 OFFICE O/P NEW MOD 45 MIN: CPT | Performed by: STUDENT IN AN ORGANIZED HEALTH CARE EDUCATION/TRAINING PROGRAM

## 2025-04-02 RX ORDER — MELOXICAM 15 MG/1
15 TABLET ORAL DAILY
Qty: 30 TABLET | Refills: 0 | Status: SHIPPED | OUTPATIENT
Start: 2025-04-02

## 2025-04-02 NOTE — PROGRESS NOTES
Chief Complaint   Patient presents with    Right Knee - New Patient Visit     Xrays done 3/29/2025       THUY Larsen is a 45-year-old female presenting today as a new patient for initial orthopedic evaluation of her right knee.  Last week patient sustained an injury to the right knee.  She rolled her left ankle landed awkwardly about her right knee resulting in sharp knee pain.  Pain is sharp worse on movement relieved with rest did not have any issues with the knee prior to the fall    Past Medical History:   Diagnosis Date    Anemia     Anxiety     Bilateral renal stones     Depression     Postural orthostatic tachycardia syndrome (POTS)     Seasonal affective disorder     Vitamin B12 deficiency     Vitamin D deficiency        No past surgical history on file.     Allergies   Allergen Reactions    Gabapentin Swelling     Causes joints to swell     Morphine Other    Topiramate Other        Physical exam    General: Alert and oriented to place, person, and time.  No acute distress and breathing comfortably; pleasant and cooperative with the examination.  HEENT: Head is normocephalic and atraumatic.  Neck: Supple, no visible swelling.  Cardiovascular: Good perfusion to the affected extremity.  Lungs: No audible wheezing or labored breathing.  Abdomen: Nondistended  HEME/Lymph : No visible abnormalities bilateral lower extremity    Extremity:  Right knee:  Skin healthy and intact  No gross swelling or ecchymosis  No significant varus or valgus malalignment  Effusion: Mild     ROM:  Full flexion   Full extension  No pain with internal rotation of the hip  Tenderness to palpation:.  Medial joint line lateral joint line     No laxity to valgus stress  No laxity to varus stress  Negative Lachman´s test  Negative anterior drawer test  Negative posterior drawer test  Positive Edyta´s test     Neurovascular exam normal distally    Diagnostics:  Imaging  I personally reviewed multiple radiographs dated 3/29/2025 of  right knee, tib-fib, foot and ankle and agree with the following findings  XR foot right 3+ views    Result Date: 3/29/2025  No acute osseous abnormality of the right lower extremity as imaged.     MACRO: None.   Signed by: Efe Ríos 3/29/2025 10:37 PM Dictation workstation:   [id number]    XR ankle right 3+ views    Result Date: 3/29/2025  No acute osseous abnormality of the right lower extremity as imaged.     MACRO: None.   Signed by: Efe Ríos 3/29/2025 10:37 PM Dictation workstation:   [id number]    XR tibia fibula right 2 views    Result Date: 3/29/2025  No acute osseous abnormality of the right lower extremity as imaged.     MACRO: None.   Signed by: Efe Ríos 3/29/2025 10:37 PM Dictation workstation:   [id number]    XR knee right 4+ views    Result Date: 3/29/2025  No acute osseous abnormality of the right lower extremity as imaged.     MACRO: None.   Signed by: Efe Ríos 3/29/2025 10:37 PM Dictation workstation:   [id number]     Cardiology, Vascular, and Other Imaging  No other imaging results found for the past 7 days       Procedure:  Procedures    Assessment:  45-year-old female with concerns for right knee internal derangement    Treatment plan:  Outside x-rays reviewed, ED note reviewed  The natural history of the condition and its associated treatment alternatives including surgical and nonsurgical options were discussed with the patient at length.  Constellation of findings discussed with patient in detail today.  Given large effusion and continued severe pain, inability to bear weight we will proceed with an MRI for further evaluation of internal derangement.  Will provide her with a prescription for meloxicam for symptomatic relief  All of the patient's questions were answered.  The history and clinical exam are consistent with intraarticular pathology and therefore MRI is medically indicated to evaluate the soft tissues of the joint. Follow up in one week or  after completion of the MRI to advance management accordingly.  The patient understands and agrees with the plan.    Rajan Wood PA-C    In a face to face encounter, I evaluated the patient and performed a physical examination, discussed pertinent diagnostic studies if indicated and discussed diagnosis and management strategies with both the patient and physician assistant / nurse practitioner.  I reviewed the PA/NP's note and agree with the documented findings and plan of care.     45-year-old female with right knee sprain concern for internal derangement.  Sustained a fall last week resulting in sharp pain about her knee currently utilizing a knee immobilizer.  Able to form straight leg raise moderate tenderness outpatient diffusely about the knee especially about the medial lateral joint lines.  X-rays reviewed my interpretation as follows: No acute osseous abnormality no fracture dislocation appreciated.  Assessment plan 45-year-old female with concerns for internal derangement knee.  Plan MRI.  Will provide oral anti-inflammatories to provide some pain    Tim Ashraf III, MD

## 2025-04-03 ENCOUNTER — TELEPHONE (OUTPATIENT)
Dept: ORTHOPEDIC SURGERY | Facility: CLINIC | Age: 45
End: 2025-04-03
Payer: MEDICAID

## 2025-04-03 NOTE — TELEPHONE ENCOUNTER
Suzie call about medication that was sent in yesterday. She is having surgery on 04/15/25 with Dr. Kaiser for RIGHT URETEROSCOPY FLEX VS RIGID WITH LASER AND CYSTO WITH STENT     She would like to know if she is allowed to take meloxicam now if she surgery is 04/15/2025

## 2025-04-04 RX ORDER — VIBEGRON 75 MG/1
75 TABLET, FILM COATED ORAL DAILY
COMMUNITY

## 2025-04-04 ASSESSMENT — DUKE ACTIVITY SCORE INDEX (DASI)
CAN YOU TAKE CARE OF YOURSELF (EAT, DRESS, BATHE, OR USE TOILET): YES
CAN YOU HAVE SEXUAL RELATIONS: YES
CAN YOU PARTICIPATE IN MODERATE RECREATIONAL ACTIVITIES LIKE GOLF, BOWLING, DANCING, DOUBLES TENNIS OR THROWING A BASEBALL OR FOOTBALL: YES
CAN YOU DO MODERATE WORK AROUND THE HOUSE LIKE VACUUMING, SWEEPING FLOORS OR CARRYING GROCERIES: YES
CAN YOU WALK INDOORS, SUCH AS AROUND YOUR HOUSE: YES
DASI METS SCORE: 9.9
CAN YOU DO LIGHT WORK AROUND THE HOUSE LIKE DUSTING OR WASHING DISHES: YES
TOTAL_SCORE: 58.2
CAN YOU WALK A BLOCK OR TWO ON LEVEL GROUND: YES
CAN YOU DO YARD WORK LIKE RAKING LEAVES, WEEDING OR PUSHING A MOWER: YES
CAN YOU RUN A SHORT DISTANCE: YES
CAN YOU PARTICIPATE IN STRENOUS SPORTS LIKE SWIMMING, SINGLES TENNIS, FOOTBALL, BASKETBALL, OR SKIING: YES
CAN YOU CLIMB A FLIGHT OF STAIRS OR WALK UP A HILL: YES
CAN YOU DO HEAVY WORK AROUND THE HOUSE LIKE SCRUBBING FLOORS OR LIFTING AND MOVING HEAVY FURNITURE: YES

## 2025-04-04 ASSESSMENT — LIFESTYLE VARIABLES: SMOKING_STATUS: SMOKER

## 2025-04-04 ASSESSMENT — ACTIVITIES OF DAILY LIVING (ADL): ADL_SCORE: 0

## 2025-04-07 ENCOUNTER — LAB (OUTPATIENT)
Dept: LAB | Facility: HOSPITAL | Age: 45
End: 2025-04-07
Payer: MEDICAID

## 2025-04-07 ENCOUNTER — PRE-ADMISSION TESTING (OUTPATIENT)
Dept: PREADMISSION TESTING | Facility: HOSPITAL | Age: 45
End: 2025-04-07
Payer: MEDICAID

## 2025-04-07 VITALS
BODY MASS INDEX: 35.19 KG/M2 | HEIGHT: 70 IN | RESPIRATION RATE: 18 BRPM | DIASTOLIC BLOOD PRESSURE: 63 MMHG | WEIGHT: 245.81 LBS | HEART RATE: 84 BPM | SYSTOLIC BLOOD PRESSURE: 134 MMHG | TEMPERATURE: 97.3 F | OXYGEN SATURATION: 95 %

## 2025-04-07 DIAGNOSIS — Z01.818 PREOP EXAMINATION: ICD-10-CM

## 2025-04-07 DIAGNOSIS — N20.0 CALCULUS OF KIDNEY: Primary | ICD-10-CM

## 2025-04-07 DIAGNOSIS — Z01.818 ENCOUNTER FOR OTHER PREPROCEDURAL EXAMINATION: ICD-10-CM

## 2025-04-07 LAB
ANION GAP SERPL CALC-SCNC: 9 MMOL/L (ref 10–20)
APPEARANCE UR: CLEAR
BILIRUB UR STRIP.AUTO-MCNC: NEGATIVE MG/DL
BUN SERPL-MCNC: 10 MG/DL (ref 6–23)
CALCIUM SERPL-MCNC: 8.6 MG/DL (ref 8.6–10.3)
CHLORIDE SERPL-SCNC: 107 MMOL/L (ref 98–107)
CO2 SERPL-SCNC: 27 MMOL/L (ref 21–32)
COLOR UR: ABNORMAL
CREAT SERPL-MCNC: 0.66 MG/DL (ref 0.5–1.05)
EGFRCR SERPLBLD CKD-EPI 2021: >90 ML/MIN/1.73M*2
GLUCOSE SERPL-MCNC: 106 MG/DL (ref 74–99)
GLUCOSE UR STRIP.AUTO-MCNC: NORMAL MG/DL
HOLD SPECIMEN: NORMAL
KETONES UR STRIP.AUTO-MCNC: NEGATIVE MG/DL
LEUKOCYTE ESTERASE UR QL STRIP.AUTO: NEGATIVE
MUCOUS THREADS #/AREA URNS AUTO: NORMAL /LPF
NITRITE UR QL STRIP.AUTO: NEGATIVE
PH UR STRIP.AUTO: 7 [PH]
POTASSIUM SERPL-SCNC: 4 MMOL/L (ref 3.5–5.3)
PROT UR STRIP.AUTO-MCNC: NEGATIVE MG/DL
RBC # UR STRIP.AUTO: ABNORMAL MG/DL
RBC #/AREA URNS AUTO: NORMAL /HPF
SODIUM SERPL-SCNC: 139 MMOL/L (ref 136–145)
SP GR UR STRIP.AUTO: 1.02
SQUAMOUS #/AREA URNS AUTO: NORMAL /HPF
UROBILINOGEN UR STRIP.AUTO-MCNC: NORMAL MG/DL
WBC #/AREA URNS AUTO: NORMAL /HPF

## 2025-04-07 PROCEDURE — 81001 URINALYSIS AUTO W/SCOPE: CPT

## 2025-04-07 PROCEDURE — 80048 BASIC METABOLIC PNL TOTAL CA: CPT

## 2025-04-07 PROCEDURE — 99202 OFFICE O/P NEW SF 15 MIN: CPT

## 2025-04-07 RX ORDER — FEXOFENADINE HCL AND PSEUDOEPHEDRINE HCL 180; 240 MG/1; MG/1
1 TABLET, EXTENDED RELEASE ORAL DAILY
COMMUNITY

## 2025-04-07 RX ORDER — ROSUVASTATIN CALCIUM 20 MG/1
20 TABLET, COATED ORAL DAILY
COMMUNITY

## 2025-04-07 ASSESSMENT — PAIN - FUNCTIONAL ASSESSMENT: PAIN_FUNCTIONAL_ASSESSMENT: 0-10

## 2025-04-07 NOTE — PREPROCEDURE INSTRUCTIONS
Thank you for visiting [facility] at [facility]. If you have any changes to your health condition, please call the SURGEON's office to alert them and give them details of your symptoms.  STOP all NSAIDS (Ibuprofen, Motrin, Aleve), vitamins, herbals, supplements, and all over the counter medications for 7 days prior to surgery  Tylenol is okay to take up until the morning of surgery for pain or headache if needed         Preoperative Brain Exercises    What are brain exercises?  A brain exercise is any activity that engages your thinking (cognitive) skills.    What types of activities are considered brain exercises?  Jigsaw puzzles, crossword puzzles, word jumble, memory games, word search, and many more.  Many can be found free online or on your phone via a mobile robi.    Why should I do brain exercises before my surgery?  More recent research has shown brain exercise before surgery can lower the risk of postoperative delirium (confusion) which can be especially important for older adults.  Patients who did brain exercises for 5 to 10 hours the days before surgery, cut their risk of postoperative delirium in half up to 1 week after surgery.      Preoperative Deep Breathing Exercises    Why it is important to do deep breathing exercises before my surgery?  Deep breathing exercises strengthen your breathing muscles.  This helps you to recover after your surgery and decreases the chance of breathing complications.    How are the deep breathing exercises done?  Sit straight with your back supported.  Breathe in deeply and slowly through your nose. Your lower rib cage should expand and your abdomen may move forward.  Hold that breath for 3 to 5 seconds.  Breathe out through pursed lips, slowly and completely.  Rest and repeat 10 times every hour while awake.  Rest longer if you become dizzy or lightheaded.      Patient and Family Education   Ways You Can Help Prevent Blood Clots     This handout explains some simple  things you can do to help prevent blood clots.      Blood clots are blockages that can form in the body's veins. When a blood clot forms in your deep veins, it may be called a deep vein thrombosis, or DVT for short. Blood clots can happen in any part of the body where blood flows, but they are most common in the arms and legs. If a piece of a blood clot breaks free and travels to the lungs, it is called a pulmonary embolus (PE). A PE can be a very serious problem.      Being in the hospital or having surgery can raise your chances of getting a blood clot because you may not be well enough to move around as much as you normally do.      Ways you can help prevent blood clots in the hospital         Wearing SCDs. SCDs stands for Sequential Compression Devices.   SCDs are special sleeves that wrap around your legs  They attach to a pump that fills them with air to gently squeeze your legs every few minutes.   This helps return the blood in your legs to your heart.   SCDs should only be taken off when walking or bathing.   SCDs may not be comfortable, but they can help save your life.               Wearing compression stockings - if your doctor orders them. These special snug fitting stockings gently squeeze your legs to help blood flow.       Walking. Walking helps move the blood in your legs.   If your doctor says it is ok, try walking the halls at least   5 times a day. Ask us to help you get up, so you don't fall.      Taking any blood thinning medicines your doctor orders.          ©[facility]; 3/23        Ways you can help prevent blood clots at home       Wearing compression stockings - if your doctor orders them. ? Walking - to help move the blood in your legs.       Taking any blood thinning medicines your doctor orders.      Signs of a blood clot or PE      Tell your doctor or nurse know right away if you have of the problems listed below.    If you are at home, seek medical care right away. Call [phone]  for chest pain or problems breathing.          Signs of a blood clot (DVT) - such as pain,  swelling, redness or warmth in your arm or leg      Signs of a pulmonary embolism (PE) - such as chest     pain or feeling short of breath

## 2025-04-07 NOTE — PREPROCEDURE INSTRUCTIONS
Medication List            Accurate as of April 7, 2025 10:47 AM. Always use your most recent med list.                butalbital-acetaminophen-caff -40 mg tablet  Additional Medication Adjustments for Surgery: Take last dose 7 days before surgery     fexofenadine-pseudoephedrine 180-240 mg 24 hr tablet  Commonly known as: Allegra-D 24  Medication Adjustments for Surgery: Do Not take on the morning of surgery     FLUoxetine 40 mg capsule  Commonly known as: PROzac  Medication Adjustments for Surgery: Take/Use as prescribed     Gemtesa 75 mg tablet  Generic drug: vibegron  Medication Adjustments for Surgery: Take last dose 1 day (24 hours) before surgery     LORazepam 1 mg tablet  Commonly known as: Ativan  Medication Adjustments for Surgery: Take/Use as prescribed     meloxicam 15 mg tablet  Commonly known as: Mobic  Take 1 tablet (15 mg) by mouth once daily.  Additional Medication Adjustments for Surgery: Take last dose 7 days before surgery     ondansetron ODT 4 mg disintegrating tablet  Commonly known as: Zofran-ODT  Dissolve 1 tablet (4 mg) in the mouth every 8 hours if needed for nausea or vomiting for up to 15 doses.  Medication Adjustments for Surgery: Take/Use as prescribed     oxyCODONE-acetaminophen 5-325 mg tablet  Commonly known as: Percocet  Take 1 tablet by mouth every 6 hours if needed for severe pain (7 - 10) for up to 12 doses.  Medication Adjustments for Surgery: Take/Use as prescribed     progesterone 100 mg capsule  Commonly known as: Prometrium  Additional Medication Adjustments for Surgery: Other (Comment)  Notes to patient: Coordinate with prescribing provider for further instructions on this medications prior to surgery.     pseudoephedrine 30 mg tablet  Commonly known as: Sudafed  Medication Adjustments for Surgery: Do Not take on the morning of surgery     rosuvastatin 20 mg tablet  Commonly known as: Crestor  Medication Adjustments for Surgery: Take/Use as prescribed      ZOLMitriptan 2.5 mg tablet  Commonly known as: Zomig  Medication Adjustments for Surgery: Do Not take on the morning of surgery                                  PRE-OPERATIVE INSTRUCTIONS    You will receive notification one business day prior to your procedure to confirm your arrival time. It is important that you answer your phone and/or check your messages during this time. If you do not hear from the surgery center by 5 pm. the day before your procedure, please call [phone].     Please enter the building through the Outpatient entrance and take the elevator off the lobby to the 2nd floor then check in at the Outpatient Surgery desk on the 2nd floor.    INSTRUCTIONS:  Talk to your surgeon for instructions if you should stop your aspirin, blood thinner, or diabetes medicines.  DO NOT take any multivitamins or over the counter supplements for 7-10 days before surgery.  If not being admitted, you must have an adult immediately available to drive you home after surgery. We also highly recommend you have someone stay with you for the entire day and night of your surgery.  For children having surgery, a parent or legal guardian must accompany them to the surgery center. If this is not possible, please call [phone] to make additional arrangements.  For adults who are unable to consent or make medical decisions for themselves, a legal guardian or Power of  must accompany them to the surgery center. If this is not possible, please call [phone] to make additional arrangements.  Wear comfortable, loose fitting clothing.  All jewelry and piercings must be removed. If you are unable to remove an item or have a dermal piercing, please be sure to tell the nurse when you arrive for surgery.  Nail polish and make-up must be removed.  Avoid smoking or consuming alcohol for 24 hours before surgery.  To help prevent infection, please take a shower/bath and wash your hair the night before and/or morning of  surgery (or follow other specific bathing instructions provided).    Preoperative Fasting Guidelines    Why must I stop eating and drinking near surgery time?  With sedation, food or liquid in your stomach can enter your lungs causing serious complications  Increases nausea and vomiting    When do I need to stop eating and drinking before my surgery?  Do not eat any solid food after midnight the night before your surgery/procedure unless otherwise instructed by your surgeon.   You may have up to 13.5 ounces of clear liquid until TWO hours before your instructed arrival time to the hospital.  This includes water, black tea/coffee, (no milk or cream) apple juice, and electrolyte drinks (Gatorade).   You may chew gum until TWO hours before your surgery/procedure      If applicable, notify your surgeons office immediately of any new skin changes that occur to the surgical limb.      If you have any questions or concerns, please call Pre-Admission Testing at [phone].       Home Preoperative Antibacterial Shower with Chlorhexidine gluconate (CHG)     What is a home preoperative antibacterial shower?  This shower is a way of cleaning the skin with a germ killing solution before surgery. The solution contains chlorhexidine gluconate, commonly known as CHG. CHG is a skin cleanser with germ killing ability. Let your doctor know if you are allergic to chlorhexidine.    Why do I need to take a preoperative antibacterial shower?  Skin is not sterile. It is best to try to make your skin as free of germs as possible before surgery. Proper cleansing with a germ killing soap before surgery can lower the number of germs on your skin. This helps to reduce the risk of infection at the surgical site. Following the instructions listed below will help you prepare your skin for surgery.    How do I use the solution?  Begin using your CHG soap the night before and again the morning of your procedure.   Do not shave the day before or  day of surgery.  Remove all jewelry until after surgery. Take off rings and take out all body-piercing jewelry.  Wash your face and hair with normal soap and shampoo before you use the CHG soap.  Apply the CHG solution to a clean wet washcloth. Move away from the water to avoid premature rinsing of the CHG soap as you are applying. Firmly lather your entire body from the neck down. Do not use CHG on your face, eyes, ears, or genitals.   Pay special attention to the area where your incisions will be located.  Do not scrub your skin too hard.  It is important to allow the CHG soap to sit on your skin for 3-5 minutes.  Rinse the solution off your body completely. Do not wash with your normal soap after the CHG soap solution.  Pat yourself dry with a clean, soft towel.  Do not apply powders, lotions or deodorants as these might block how the CHG soap works.   Dress in clean clothing.  Be sure to sleep with clean, freshly laundered sheets.  Be aware that CHG can cause stains on fabric. Rinse your washcloth and other linens that have contact with CHG completely. Use only non-chlorine detergents to launder the items used.

## 2025-04-07 NOTE — PREPROCEDURE INSTRUCTIONS
Patient and Family Education Quitting Smoking or Tobacco       Recognizing Dangerous Situations:   Alcohol use during the first month after quitting   Being around smoke or someone who smokes    Times situation routinely smoked   Triggers: car, breaks, coffee, when awakening, social events     Coping Skills:   Learning new ways to manage stress   Exercising   Relaxation breathing   Change routines   Distraction techniques     Websites:   Smoke-Free - offers free text messages and an robi to help you quit. Info includes eating and mood issues that may come with quitting. There is a Live Helpline to talk to an expert. Go to smokefree.gov     Become an Ex-Smoker - the free EX Plan is based on scientific research and useful advice from ex-smokers. It isn't just about quitting smoking. It's about re-learning life without cigarettes using a 3-step program. Go to becomeanex.[employer]     Centers for Disease Control - offer many suggestions for helping you quit. Includes a Quit Guide and real-life stories. There are sections for specific groups such as LGBT, , different ethnic groups, and pregnant women. Go to cdc.gov/tobacco/campaign/tips     Other Resources:     [place] Tobacco Quit Line - call 1-800-QUIT-NOW or 1-709.491.1376.   [employer] 2-1-1 - to find local programs and resources. Call 211 or go to 211.[employer] [facility]. [facility] Tobacco Cessation Program - call 935-541-8120.   American Lung Association - offers classes for quitting smoking. Some places may charge a fee. For a list of classes, go to lung.org or call -195-LUNG-USA.     Some things to think about:   The health benefits of quitting smoking can help most of the major parts of your body.   There is no safe amount of cigarette smoke. Quitting smoking can add years to your life.   When you quit, you'll also protect your loved ones from dangerous secondhand smoke.   Make a plan, join a support group, and talk to your physician to assist  in quitting smoking.     ©[facility], 11/20; PI-602 (6/22)

## 2025-04-07 NOTE — CPM/PAT H&P
CPM/PAT Evaluation       Name: Suzie Avila (Suzie Avila)  /Age: 1980/45 y.o.     In-Person       Chief Complaint: Kidney stone     HPI  Pleasant 46 y/o female presents with calculus of kidney scheduled for right ureteroscopy flex vs rigid with laser and cysto with stent on 4/15/25. States she had pain for a while. Endorses blood when she actively passes a stone. Denies any urinary symptoms. Endorses flank pain. Denies recent fever/illness/chills.     Past Medical History:   Diagnosis Date    Anemia     Anxiety     Bilateral renal stones     Depression     Hyperlipidemia     Postural orthostatic tachycardia syndrome (POTS)     Seasonal affective disorder     Vitamin B12 deficiency     Vitamin D deficiency        Past Surgical History:   Procedure Laterality Date    CARDIAC CATHETERIZATION      NO STENTS     SECTION, CLASSIC      DENTAL SURGERY      OTHER SURGICAL HISTORY      Multiple kidney stone removal surgeries    TONSILLECTOMY      TYMPANOSTOMY TUBE PLACEMENT         Patient  has no history on file for sexual activity.    Family History   Problem Relation Name Age of Onset    Heart disease Father          CABG @ AGE 49    Cancer Maternal Grandmother          LUNG CANCER    Stroke Maternal Grandmother      Diabetes Paternal Grandmother         Allergies   Allergen Reactions    Gabapentin Swelling     Causes joints to swell     Morphine Other     SEVERE MUSCLE CONTRACTIONS    Topiramate Other     KIDNEY STONES       Prior to Admission medications    Medication Sig Start Date End Date Taking? Authorizing Provider   butalbital-acetaminophen-caff -40 mg tablet Take 2 tablets by mouth every 4 hours if needed for headaches.    Historical Provider, MD   FLUoxetine (PROzac) 40 mg capsule Take 1 capsule (40 mg) by mouth once daily.    Historical Provider, MD   LORazepam (Ativan) 1 mg tablet Take 1 tablet (1 mg) by mouth every 6 hours if needed for anxiety.    Historical Provider, MD    meloxicam (Mobic) 15 mg tablet Take 1 tablet (15 mg) by mouth once daily. 4/2/25   Tim Ashraf MD   ondansetron ODT (Zofran-ODT) 4 mg disintegrating tablet Dissolve 1 tablet (4 mg) in the mouth every 8 hours if needed for nausea or vomiting for up to 15 doses. 2/9/25   Evert Bain MD   oxyCODONE-acetaminophen (Percocet) 5-325 mg tablet Take 1 tablet by mouth every 6 hours if needed for severe pain (7 - 10) for up to 12 doses.  Patient not taking: Reported on 3/19/2025 2/9/25   Evert Bain MD   progesterone (Prometrium) 100 mg capsule Take 1 capsule (100 mg) by mouth once daily.    Historical Provider, MD   pseudoephedrine (Sudafed) 30 mg tablet Take 1 tablet (30 mg) by mouth every 4 hours if needed for congestion.    Historical Provider, MD   vibegron (Gemtesa) 75 mg tablet Take 1 tablet (75 mg) by mouth once daily.    Historical Provider, MD   ZOLMitriptan (Zomig) 2.5 mg tablet Take 1 tablet (2.5 mg) by mouth 1 time if needed for migraine. May repeat in 2 hours if unresolved. Do not exceed 10 mg in 24 hours.    Historical Provider, MD        Constitutional: Negative for fever, chills, or sweats   ENMT: Negative for nasal discharge, congestion, ear pain, mouth pain, throat pain. Positive for glasses. Positive for upper dentures. Positive for poor dentition. Positive for seasonal allergies.   Respiratory: Negative for cough, wheezing, shortness of breath. Positive for chronic cough and SOB.    Cardiac: Negative for chest pain, dyspnea on exertion, palpitations   Gastrointestinal: Negative for nausea, vomiting, diarrhea, constipation, abdominal pain. Positive for recent nausea/constipation due to iron infusions.   Genitourinary: Negative for dysuria, flank pain, frequency, hematuria. See HPI   Musculoskeletal: Negative for decreased ROM, pain, swelling, weakness. Positive for feet/hands-numbness/tingling. Positive for right knee pain-wearing a brace. Positive for right ankle-sprain/pain.   Neurological:  "Negative for dizziness, confusion, headache. Positive for chronic migraines.   Psychiatric: Negative for mood changes   Skin: Negative for itching, rash, ulcer    Hematologic/Lymph: Negative for bruising, easy bleeding  Allergic/Immunologic: Negative itching, sneezing, swelling      Physical Exam  Vitals reviewed.   Constitutional:       Appearance: Normal appearance.   HENT:      Head: Normocephalic.      Mouth/Throat:      Mouth: Mucous membranes are moist.      Pharynx: Oropharynx is clear.   Eyes:      Pupils: Pupils are equal, round, and reactive to light.   Cardiovascular:      Rate and Rhythm: Normal rate and regular rhythm.      Heart sounds: Normal heart sounds.   Pulmonary:      Effort: Pulmonary effort is normal.      Breath sounds: Normal breath sounds.   Abdominal:      General: Bowel sounds are normal.      Palpations: Abdomen is soft.   Musculoskeletal:         General: Normal range of motion.      Cervical back: Normal range of motion.   Skin:     General: Skin is warm and dry.   Neurological:      General: No focal deficit present.      Mental Status: She is alert and oriented to person, place, and time.   Psychiatric:         Mood and Affect: Mood normal.         Behavior: Behavior normal.          PAT AIRWAY:   Airway:     Mallampati::  II    Neck ROM::  Full  normal        Testing/Diagnostic:     Patient Specialist/PCP:   PCP: Dr. Strange     Visit Vitals  /63   Pulse 84   Temp 36.3 °C (97.3 °F) (Temporal)   Resp 18   Ht 1.778 m (5' 10\")   Wt 112 kg (245 lb 13 oz)   SpO2 95%   BMI 35.27 kg/m²   Smoking Status Every Day   BSA 2.35 m²       DASI Risk Score      Flowsheet Row Pre-Admission Testing from 4/7/2025 in [facility] [place]   Can you take care of yourself (eat, dress, bathe, or use toilet)?  2.75 filed at 04/04/2025 1048   Can you walk indoors, such as around your house? 1.75 filed at 04/04/2025 1048   Can you walk a block or two on level ground?  2.75 filed at 04/04/2025 " 1048   Can you climb a flight of stairs or walk up a hill? 5.5 filed at 04/04/2025 1048   Can you run a short distance? 8 filed at 04/04/2025 1048   Can you do light work around the house like dusting or washing dishes? 2.7 filed at 04/04/2025 1048   Can you do moderate work around the house like vacuuming, sweeping floors or carrying groceries? 3.5 filed at 04/04/2025 1048   Can you do heavy work around the house like scrubbing floors or lifting and moving heavy furniture?  8 filed at 04/04/2025 1048   Can you do yard work like raking leaves, weeding or pushing a mower? 4.5 filed at 04/04/2025 1048   Can you have sexual relations? 5.25 filed at 04/04/2025 1048   Can you participate in moderate recreational activities like golf, bowling, dancing, doubles tennis or throwing a baseball or football? 6 filed at 04/04/2025 1048   Can you participate in strenous sports like swimming, singles tennis, football, basketball, or skiing? 7.5 filed at 04/04/2025 1048   DASI SCORE 58.2 filed at 04/04/2025 1048   METS Score (Will be calculated only when all the questions are answered) 9.9 filed at 04/04/2025 1048          Caprini DVT Assessment      Flowsheet Row Pre-Admission Testing from 4/7/2025 in [facility]   DVT Score (IF A SCORE IS NOT CALCULATING, MUST SELECT A BMI TO COMPLETE) 3 filed at 04/04/2025 1052   Surgical Factors Minor surgery planned filed at 04/04/2025 1052   BMI (BMI MUST BE CHOSEN) 30 or less filed at 04/04/2025 1052          Modified Frailty Index      Flowsheet Row Pre-Admission Testing from 4/7/2025 in [facility]   Non-independent functional status (problems with dressing, bathing, personal grooming, or cooking) 0 filed at 04/04/2025 1049   History of diabetes mellitus  0 filed at 04/04/2025 1049   History of COPD 0 filed at 04/04/2025 1049   History of CHF No filed at 04/04/2025 1049   History of MI 0 filed at 04/04/2025 1049   History of Percutaneous Coronary Intervention,  Cardiac Surgery, or Angina No filed at 04/04/2025 1049   Hypertension requiring the use of medication  0 filed at 04/04/2025 1049   Peripheral vascular disease 0 filed at 04/04/2025 1049   Impaired sensorium (cognitive impairement or loss, clouding, or delirium) 0 filed at 04/04/2025 1049   TIA or CVA withouy residual deficit 0 filed at 04/04/2025 1049   Cerebrovascular accident with deficit 0 filed at 04/04/2025 1049   Modified Frailty Index Calculator 0 filed at 04/04/2025 1049          [id number] Stroke Risk Points  Current as of just now        N/A 0 to 9 Points:      Last Change: N/A          The [id number] risk score (Lip BEATRICE, et al. 2009. © 2010 [employer]) quantifies the risk of stroke for a patient with atrial fibrillation. For patients without atrial fibrillation or under the age of 18 this score appears as N/A. Higher score values generally indicate higher risk of stroke.        This score is not applicable to this patient. Components are not calculated.          Revised Cardiac Risk Index      Flowsheet Row Pre-Admission Testing from 4/7/2025 in [facility] [place]   High-Risk Surgery (Intraperitoneal, Intrathoracic,Suprainguinal vascular) 0 filed at 04/04/2025 1049   History of ischemic heart disease (History of MI, History of positive exercuse test, Current chest paint considered due to myocardial ischemia, Use of nitrate therapy, ECG with pathological Q Waves) 0 filed at 04/04/2025 1049   History of congestive heart failure (pulmonary edemia, bilateral rales or S3 gallop, Paroxysmal nocturnal dyspnea, CXR showing pulmonary vascular redistribution) 0 filed at 04/04/2025 1049   History of cerebrovascular disease (Prior TIA or stroke) 0 filed at 04/04/2025 1049   Pre-operative insulin treatment 0 filed at 04/04/2025 1049   Pre-operative creatinine>2 mg/dl 0 filed at 04/04/2025 1049   Revised Cardiac Risk Calculator 0 filed at 04/04/2025 1049          Apfel Simplified  Score      Flowsheet Row [place] [facility]   Smoking status 0 filed at 2025 1049   History of motion sickness or PONV  0 filed at 2025 1049   Use of postoperative opioids 0 filed at 2025 1049   Gender - Female 1=Yes filed at 2025 1049   Apfel Simplified Score Calculator 1 filed at 2025 1049          Risk Analysis Index Results This Encounter         2025  1049             Do you live in a place other than your own home?: 0    When did you begin living in the place you are currently residing?: Greater than one year ago    Any kidney failure, kidney not working well, or seeing a kidney doctor (nephrologist)? If yes, was this for kidney stones or another problem?: 1 Kidney stones    Any history of chronic (long-term) congestive heart failure (CHF)?: 0 No    Any shortness of breath when resting?: 0 No    In the past five years, have you been diagnosed with or treated for cancer?: No    During the last 3 months has it become difficult for you to remember things or organize your thoughts?: 0 No    Have you lost weight of 10 pounds or more in the past 3 months without trying?: 0 No    Do you have any loss of appetitie?: 0 No    Getting Around (Mobility): 0 Can get around without help    Eatin Can plan and prepare own meals    Toiletin Can use toilet without any help    Personal Hygiene (Bathing, Hand Washing, Changing Clothes): 0 Can shower or bathe without any help    FREIRE Cancer History: Patient does not indicate history of cancer    Total Risk Analysis Index Score Without Cancer: 13    Total Risk Analysis Index Score: 13          Stop Bang Score      Flowsheet Row [place] [facility]   Do you snore loudly? 1 filed at 2025 1047   Do you often feel tired or fatigued after your sleep? 0 filed at 2025 1047   Has anyone ever observed you stop breathing in your sleep? 0 filed at  04/04/2025 1047   Do you have or are you being treated for high blood pressure? 0 filed at 04/04/2025 1047   Recent BMI (Calculated) 30.9 filed at 04/04/2025 1047   Is BMI greater than 35 kg/m2? 0=No filed at 04/04/2025 1047   Age older than 50 years old? 0=No filed at 04/04/2025 1047   Is your neck circumference greater than 17 inches (Male) or 16 inches (Female)? 1 filed at 04/04/2025 1047   Gender - Male 0=No filed at 04/04/2025 1047   STOP-BANG Total Score 2 filed at 04/04/2025 1047          Prodigy: High Risk  Total Score: 3              Prodigy Previous Opioid Use Score           ARISCAT Score for Postoperative Pulmonary Complications      Flowsheet Row Pre-Admission Testing from 4/7/2025 in [facility] [place]   Age Calculated Score 0 filed at 04/04/2025 1052   Preoperative SpO2 8 filed at 04/04/2025 1052   Respiratory infection in the last month Either upper or lower (i.e., URI, bronchitis, pneumonia), with fever and antibiotic treatment 0 filed at 04/04/2025 1052   Preoperative anemia (Hgb less than 10 g/dl) 0 filed at 04/04/2025 1052   Surgical incision  0 filed at 04/04/2025 1052   Duration of surgery  0 filed at 04/04/2025 1052   Emergency Procedure  0 filed at 04/04/2025 1052   ARISCAT Total Score  8 filed at 04/04/2025 1052          Martin Perioperative Risk for Myocardial Infarction or Cardiac Arrest (ANN)      Flowsheet Row Pre-Admission Testing from 4/7/2025 in [facility] [place]   Calculated Age Score 0.9 filed at 04/04/2025 1052   Functional Status  0 filed at 04/04/2025 1052   ASA Class  -5.17 filed at 04/04/2025 1052   Creatinine 0 filed at 04/04/2025 1052   Type of Procedure  -0.26 filed at 04/04/2025 1052   ANN Total Score  -9.78 filed at 04/04/2025 1052   ANN % 0.01 filed at 04/04/2025 1052            Assessment and Plan:     Assessment and Plan:     Preop:   OR with Dr. Kaiser on 4/15/25 for right ureteroscopy, laser litho, stent   Labs ordered per Dr. Kaiser.   EKG on file  from 2/25/25. NSR. Per ER note-EKG unchanged from prior EKG in 2023 and 2009     Neurologic:   Migraines: Chronic migraines   The patient is at an increased risk for post operative delirium secondary to depression , polypharmacy , and type and duration of surgery . Preoperative brain exercise educational handout provided to patient.  The patient is at an increased risk for perioperative stroke secondary to female sex  and general anesthesia.    Cardiac:  Hyperlipidemia: On statin   POTS: Stable per patient. Asymptomatic.   [facility] Activity Status Index (DASI)  DASI Score: 58.2   MET Score: 9.9  RCRI  0 which is 3.9% 30 day risk of MACE (risk for cardiac death, nonfatal myocardial infarction, and nonfactal cardiac arrest)  ANN score which indicates a   0.01% risk of intraoperative or 30-day postoperative MACE    Pulmonary:   STOP-BANG score of  2. Low  risk of obstructive sleep apnea.   ARISCAT:    0  points which is a low (1.6%) risk of in-hospital post-op pulmonary complications     Endocrine:  Hashimoto disease: States she is working with rheumatology to determine cause/treatment.     GI:  Apfel: 1 points 21% risk for post operative N/V    /Renal:   Kidney stone: Reason for upcoming procedure     Neuro-muscular:   RA?: Currently working with rheumatology   Right knee injury-currently wearing a brace     Psychiatric:   Anxiety/depression: Stable with medication. Denies thoughts harming self/others.     Hematologic:   Anemia: Has had iron transfusions-last one was one week ago. CBC ordered   Caprini score 3, patient at high risk for perioperative DVT. Patient provided with VTE education/handout.     Skin check: Patient was instructed to make surgeon aware of any skin changes/concerns prior to surgery.     Anesthesia: States she has woken up during surgery previously     *See risk scores as previously documented

## 2025-04-07 NOTE — H&P (VIEW-ONLY)
CPM/PAT Evaluation       Name: Suzie Avila (Suzie Avila)  /Age: 1980/45 y.o.     In-Person       Chief Complaint: Kidney stone     HPI  Pleasant 44 y/o female presents with calculus of kidney scheduled for right ureteroscopy flex vs rigid with laser and cysto with stent on 4/15/25. States she had pain for a while. Endorses blood when she actively passes a stone. Denies any urinary symptoms. Endorses flank pain. Denies recent fever/illness/chills.     Past Medical History:   Diagnosis Date    Anemia     Anxiety     Bilateral renal stones     Depression     Hyperlipidemia     Postural orthostatic tachycardia syndrome (POTS)     Seasonal affective disorder     Vitamin B12 deficiency     Vitamin D deficiency        Past Surgical History:   Procedure Laterality Date    CARDIAC CATHETERIZATION      NO STENTS     SECTION, CLASSIC      DENTAL SURGERY      OTHER SURGICAL HISTORY      Multiple kidney stone removal surgeries    TONSILLECTOMY      TYMPANOSTOMY TUBE PLACEMENT         Patient  has no history on file for sexual activity.    Family History   Problem Relation Name Age of Onset    Heart disease Father          CABG @ AGE 49    Cancer Maternal Grandmother          LUNG CANCER    Stroke Maternal Grandmother      Diabetes Paternal Grandmother         Allergies   Allergen Reactions    Gabapentin Swelling     Causes joints to swell     Morphine Other     SEVERE MUSCLE CONTRACTIONS    Topiramate Other     KIDNEY STONES       Prior to Admission medications    Medication Sig Start Date End Date Taking? Authorizing Provider   butalbital-acetaminophen-caff -40 mg tablet Take 2 tablets by mouth every 4 hours if needed for headaches.    Historical Provider, MD   FLUoxetine (PROzac) 40 mg capsule Take 1 capsule (40 mg) by mouth once daily.    Historical Provider, MD   LORazepam (Ativan) 1 mg tablet Take 1 tablet (1 mg) by mouth every 6 hours if needed for anxiety.    Historical Provider, MD    meloxicam (Mobic) 15 mg tablet Take 1 tablet (15 mg) by mouth once daily. 4/2/25   Tim Ashraf MD   ondansetron ODT (Zofran-ODT) 4 mg disintegrating tablet Dissolve 1 tablet (4 mg) in the mouth every 8 hours if needed for nausea or vomiting for up to 15 doses. 2/9/25   Evert Bain MD   oxyCODONE-acetaminophen (Percocet) 5-325 mg tablet Take 1 tablet by mouth every 6 hours if needed for severe pain (7 - 10) for up to 12 doses.  Patient not taking: Reported on 3/19/2025 2/9/25   Evert Bain MD   progesterone (Prometrium) 100 mg capsule Take 1 capsule (100 mg) by mouth once daily.    Historical Provider, MD   pseudoephedrine (Sudafed) 30 mg tablet Take 1 tablet (30 mg) by mouth every 4 hours if needed for congestion.    Historical Provider, MD   vibegron (Gemtesa) 75 mg tablet Take 1 tablet (75 mg) by mouth once daily.    Historical Provider, MD   ZOLMitriptan (Zomig) 2.5 mg tablet Take 1 tablet (2.5 mg) by mouth 1 time if needed for migraine. May repeat in 2 hours if unresolved. Do not exceed 10 mg in 24 hours.    Historical Provider, MD        Constitutional: Negative for fever, chills, or sweats   ENMT: Negative for nasal discharge, congestion, ear pain, mouth pain, throat pain. Positive for glasses. Positive for upper dentures. Positive for poor dentition. Positive for seasonal allergies.   Respiratory: Negative for cough, wheezing, shortness of breath. Positive for chronic cough and SOB.    Cardiac: Negative for chest pain, dyspnea on exertion, palpitations   Gastrointestinal: Negative for nausea, vomiting, diarrhea, constipation, abdominal pain. Positive for recent nausea/constipation due to iron infusions.   Genitourinary: Negative for dysuria, flank pain, frequency, hematuria. See HPI   Musculoskeletal: Negative for decreased ROM, pain, swelling, weakness. Positive for feet/hands-numbness/tingling. Positive for right knee pain-wearing a brace. Positive for right ankle-sprain/pain.   Neurological:  "Negative for dizziness, confusion, headache. Positive for chronic migraines.   Psychiatric: Negative for mood changes   Skin: Negative for itching, rash, ulcer    Hematologic/Lymph: Negative for bruising, easy bleeding  Allergic/Immunologic: Negative itching, sneezing, swelling      Physical Exam  Vitals reviewed.   Constitutional:       Appearance: Normal appearance.   HENT:      Head: Normocephalic.      Mouth/Throat:      Mouth: Mucous membranes are moist.      Pharynx: Oropharynx is clear.   Eyes:      Pupils: Pupils are equal, round, and reactive to light.   Cardiovascular:      Rate and Rhythm: Normal rate and regular rhythm.      Heart sounds: Normal heart sounds.   Pulmonary:      Effort: Pulmonary effort is normal.      Breath sounds: Normal breath sounds.   Abdominal:      General: Bowel sounds are normal.      Palpations: Abdomen is soft.   Musculoskeletal:         General: Normal range of motion.      Cervical back: Normal range of motion.   Skin:     General: Skin is warm and dry.   Neurological:      General: No focal deficit present.      Mental Status: She is alert and oriented to person, place, and time.   Psychiatric:         Mood and Affect: Mood normal.         Behavior: Behavior normal.          PAT AIRWAY:   Airway:     Mallampati::  II    Neck ROM::  Full  normal        Testing/Diagnostic:     Patient Specialist/PCP:   PCP: Dr. Strange     Visit Vitals  /63   Pulse 84   Temp 36.3 °C (97.3 °F) (Temporal)   Resp 18   Ht 1.778 m (5' 10\")   Wt 112 kg (245 lb 13 oz)   SpO2 95%   BMI 35.27 kg/m²   Smoking Status Every Day   BSA 2.35 m²       DASI Risk Score      Flowsheet Row Pre-Admission Testing from 4/7/2025 in [facility] [place]   Can you take care of yourself (eat, dress, bathe, or use toilet)?  2.75 filed at 04/04/2025 1048   Can you walk indoors, such as around your house? 1.75 filed at 04/04/2025 1048   Can you walk a block or two on level ground?  2.75 filed at 04/04/2025 " 1048   Can you climb a flight of stairs or walk up a hill? 5.5 filed at 04/04/2025 1048   Can you run a short distance? 8 filed at 04/04/2025 1048   Can you do light work around the house like dusting or washing dishes? 2.7 filed at 04/04/2025 1048   Can you do moderate work around the house like vacuuming, sweeping floors or carrying groceries? 3.5 filed at 04/04/2025 1048   Can you do heavy work around the house like scrubbing floors or lifting and moving heavy furniture?  8 filed at 04/04/2025 1048   Can you do yard work like raking leaves, weeding or pushing a mower? 4.5 filed at 04/04/2025 1048   Can you have sexual relations? 5.25 filed at 04/04/2025 1048   Can you participate in moderate recreational activities like golf, bowling, dancing, doubles tennis or throwing a baseball or football? 6 filed at 04/04/2025 1048   Can you participate in strenous sports like swimming, singles tennis, football, basketball, or skiing? 7.5 filed at 04/04/2025 1048   DASI SCORE 58.2 filed at 04/04/2025 1048   METS Score (Will be calculated only when all the questions are answered) 9.9 filed at 04/04/2025 1048          Caprini DVT Assessment      Flowsheet Row Pre-Admission Testing from 4/7/2025 in [facility]   DVT Score (IF A SCORE IS NOT CALCULATING, MUST SELECT A BMI TO COMPLETE) 3 filed at 04/04/2025 1052   Surgical Factors Minor surgery planned filed at 04/04/2025 1052   BMI (BMI MUST BE CHOSEN) 30 or less filed at 04/04/2025 1052          Modified Frailty Index      Flowsheet Row Pre-Admission Testing from 4/7/2025 in [facility]   Non-independent functional status (problems with dressing, bathing, personal grooming, or cooking) 0 filed at 04/04/2025 1049   History of diabetes mellitus  0 filed at 04/04/2025 1049   History of COPD 0 filed at 04/04/2025 1049   History of CHF No filed at 04/04/2025 1049   History of MI 0 filed at 04/04/2025 1049   History of Percutaneous Coronary Intervention,  Cardiac Surgery, or Angina No filed at 04/04/2025 1049   Hypertension requiring the use of medication  0 filed at 04/04/2025 1049   Peripheral vascular disease 0 filed at 04/04/2025 1049   Impaired sensorium (cognitive impairement or loss, clouding, or delirium) 0 filed at 04/04/2025 1049   TIA or CVA withouy residual deficit 0 filed at 04/04/2025 1049   Cerebrovascular accident with deficit 0 filed at 04/04/2025 1049   Modified Frailty Index Calculator 0 filed at 04/04/2025 1049          [id number] Stroke Risk Points  Current as of just now        N/A 0 to 9 Points:      Last Change: N/A          The [id number] risk score (Lip BEATRICE, et al. 2009. © 2010 [employer]) quantifies the risk of stroke for a patient with atrial fibrillation. For patients without atrial fibrillation or under the age of 18 this score appears as N/A. Higher score values generally indicate higher risk of stroke.        This score is not applicable to this patient. Components are not calculated.          Revised Cardiac Risk Index      Flowsheet Row Pre-Admission Testing from 4/7/2025 in [facility]   High-Risk Surgery (Intraperitoneal, Intrathoracic,Suprainguinal vascular) 0 filed at 04/04/2025 1049   History of ischemic heart disease (History of MI, History of positive exercuse test, Current chest paint considered due to myocardial ischemia, Use of nitrate therapy, ECG with pathological Q Waves) 0 filed at 04/04/2025 1049   History of congestive heart failure (pulmonary edemia, bilateral rales or S3 gallop, Paroxysmal nocturnal dyspnea, CXR showing pulmonary vascular redistribution) 0 filed at 04/04/2025 1049   History of cerebrovascular disease (Prior TIA or stroke) 0 filed at 04/04/2025 1049   Pre-operative insulin treatment 0 filed at 04/04/2025 1049   Pre-operative creatinine>2 mg/dl 0 filed at 04/04/2025 1049   Revised Cardiac Risk Calculator 0 filed at 04/04/2025 1049          Apfel Simplified  Score      Flowsheet Row Pre-Admission Testing from 2025 in [facility] [place]   Smoking status 0 filed at 2025 1049   History of motion sickness or PONV  0 filed at 2025 1049   Use of postoperative opioids 0 filed at 2025 1049   Gender - Female 1=Yes filed at 2025 1049   Apfel Simplified Score Calculator 1 filed at 2025 1049          Risk Analysis Index Results This Encounter         2025  1049             Do you live in a place other than your own home?: 0    When did you begin living in the place you are currently residing?: Greater than one year ago    Any kidney failure, kidney not working well, or seeing a kidney doctor (nephrologist)? If yes, was this for kidney stones or another problem?: 1 Kidney stones    Any history of chronic (long-term) congestive heart failure (CHF)?: 0 No    Any shortness of breath when resting?: 0 No    In the past five years, have you been diagnosed with or treated for cancer?: No    During the last 3 months has it become difficult for you to remember things or organize your thoughts?: 0 No    Have you lost weight of 10 pounds or more in the past 3 months without trying?: 0 No    Do you have any loss of appetitie?: 0 No    Getting Around (Mobility): 0 Can get around without help    Eatin Can plan and prepare own meals    Toiletin Can use toilet without any help    Personal Hygiene (Bathing, Hand Washing, Changing Clothes): 0 Can shower or bathe without any help    FREIRE Cancer History: Patient does not indicate history of cancer    Total Risk Analysis Index Score Without Cancer: 13    Total Risk Analysis Index Score: 13          Stop Bang Score      Flowsheet Row Pre-Admission Testing from [facility] [place] [facility] [place]   Do you snore loudly? 1 filed at 2025 1047   Do you often feel tired or fatigued after your sleep? 0 filed at 2025 1047   Has anyone ever observed you stop breathing in your sleep? 0 filed at  04/04/2025 1047   Do you have or are you being treated for high blood pressure? 0 filed at 04/04/2025 1047   Recent BMI (Calculated) 30.9 filed at 04/04/2025 1047   Is BMI greater than 35 kg/m2? 0=No filed at 04/04/2025 1047   Age older than 50 years old? 0=No filed at 04/04/2025 1047   Is your neck circumference greater than 17 inches (Male) or 16 inches (Female)? 1 filed at 04/04/2025 1047   Gender - Male 0=No filed at 04/04/2025 1047   STOP-BANG Total Score 2 filed at 04/04/2025 1047          Prodigy: High Risk  Total Score: 3              Prodigy Previous Opioid Use Score           ARISCAT Score for Postoperative Pulmonary Complications      Flowsheet Row Pre-Admission Testing from 4/7/2025 in [facility] [place]   Age Calculated Score 0 filed at 04/04/2025 1052   Preoperative SpO2 8 filed at 04/04/2025 1052   Respiratory infection in the last month Either upper or lower (i.e., URI, bronchitis, pneumonia), with fever and antibiotic treatment 0 filed at 04/04/2025 1052   Preoperative anemia (Hgb less than 10 g/dl) 0 filed at 04/04/2025 1052   Surgical incision  0 filed at 04/04/2025 1052   Duration of surgery  0 filed at 04/04/2025 1052   Emergency Procedure  0 filed at 04/04/2025 1052   ARISCAT Total Score  8 filed at 04/04/2025 1052          Martin Perioperative Risk for Myocardial Infarction or Cardiac Arrest (ANN)      Flowsheet Row Pre-Admission Testing from 4/7/2025 in [facility] [place]   Calculated Age Score 0.9 filed at 04/04/2025 1052   Functional Status  0 filed at 04/04/2025 1052   ASA Class  -5.17 filed at 04/04/2025 1052   Creatinine 0 filed at 04/04/2025 1052   Type of Procedure  -0.26 filed at 04/04/2025 1052   ANN Total Score  -9.78 filed at 04/04/2025 1052   ANN % 0.01 filed at 04/04/2025 1052            Assessment and Plan:     Assessment and Plan:     Preop:   OR with Dr. Kaiser on 4/15/25 for right ureteroscopy, laser litho, stent   Labs ordered per Dr. Kaiser.   EKG on file  from 2/25/25. NSR. Per ER note-EKG unchanged from prior EKG in 2023 and 2009     Neurologic:   Migraines: Chronic migraines   The patient is at an increased risk for post operative delirium secondary to depression , polypharmacy , and type and duration of surgery . Preoperative brain exercise educational handout provided to patient.  The patient is at an increased risk for perioperative stroke secondary to female sex  and general anesthesia.    Cardiac:  Hyperlipidemia: On statin   POTS: Stable per patient. Asymptomatic.   Duke Activity Status Index (DASI)  DASI Score: 58.2   MET Score: 9.9  RCRI  0 which is 3.9% 30 day risk of MACE (risk for cardiac death, nonfatal myocardial infarction, and nonfactal cardiac arrest)  ANN score which indicates a   0.01% risk of intraoperative or 30-day postoperative MACE    Pulmonary:   STOP-BANG score of  2. Low  risk of obstructive sleep apnea.   ARISCAT:    0  points which is a low (1.6%) risk of in-hospital post-op pulmonary complications     Endocrine:  Hashimoto disease: States she is working with rheumatology to determine cause/treatment.     GI:  Apfel: 1 points 21% risk for post operative N/V    /Renal:   Kidney stone: Reason for upcoming procedure     Neuro-muscular:   RA?: Currently working with rheumatology   Right knee injury-currently wearing a brace     Psychiatric:   Anxiety/depression: Stable with medication. Denies thoughts harming self/others.     Hematologic:   Anemia: Has had iron transfusions-last one was one week ago. CBC ordered   Caprini score 3, patient at high risk for perioperative DVT. Patient provided with VTE education/handout.     Skin check: Patient was instructed to make surgeon aware of any skin changes/concerns prior to surgery.     Anesthesia: States she has woken up during surgery previously     *See risk scores as previously documented

## 2025-04-09 ENCOUNTER — APPOINTMENT (OUTPATIENT)
Dept: RHEUMATOLOGY | Facility: CLINIC | Age: 45
End: 2025-04-09
Payer: MEDICAID

## 2025-04-15 ENCOUNTER — PHARMACY VISIT (OUTPATIENT)
Dept: PHARMACY | Facility: CLINIC | Age: 45
End: 2025-04-15
Payer: MEDICAID

## 2025-04-15 ENCOUNTER — APPOINTMENT (OUTPATIENT)
Dept: RADIOLOGY | Facility: HOSPITAL | Age: 45
End: 2025-04-15
Payer: MEDICAID

## 2025-04-15 ENCOUNTER — ANESTHESIA (OUTPATIENT)
Dept: OPERATING ROOM | Facility: HOSPITAL | Age: 45
End: 2025-04-15
Payer: MEDICAID

## 2025-04-15 ENCOUNTER — ANESTHESIA EVENT (OUTPATIENT)
Dept: OPERATING ROOM | Facility: HOSPITAL | Age: 45
End: 2025-04-15
Payer: MEDICAID

## 2025-04-15 ENCOUNTER — HOSPITAL ENCOUNTER (OUTPATIENT)
Facility: HOSPITAL | Age: 45
Setting detail: OUTPATIENT SURGERY
Discharge: HOME | End: 2025-04-15
Attending: UROLOGY | Admitting: UROLOGY
Payer: MEDICAID

## 2025-04-15 VITALS
HEIGHT: 70 IN | WEIGHT: 245.81 LBS | RESPIRATION RATE: 21 BRPM | SYSTOLIC BLOOD PRESSURE: 131 MMHG | DIASTOLIC BLOOD PRESSURE: 72 MMHG | BODY MASS INDEX: 35.19 KG/M2 | OXYGEN SATURATION: 94 % | TEMPERATURE: 97.3 F | HEART RATE: 72 BPM

## 2025-04-15 DIAGNOSIS — N20.0 CALCULUS OF KIDNEY: ICD-10-CM

## 2025-04-15 LAB — HCG UR QL IA.RAPID: NEGATIVE

## 2025-04-15 PROCEDURE — 2500000004 HC RX 250 GENERAL PHARMACY W/ HCPCS (ALT 636 FOR OP/ED): Performed by: ANESTHESIOLOGY

## 2025-04-15 PROCEDURE — C1769 GUIDE WIRE: HCPCS | Performed by: UROLOGY

## 2025-04-15 PROCEDURE — 3700000001 HC GENERAL ANESTHESIA TIME - INITIAL BASE CHARGE: Performed by: UROLOGY

## 2025-04-15 PROCEDURE — 7100000010 HC PHASE TWO TIME - EACH INCREMENTAL 1 MINUTE: Performed by: UROLOGY

## 2025-04-15 PROCEDURE — 2500000004 HC RX 250 GENERAL PHARMACY W/ HCPCS (ALT 636 FOR OP/ED): Performed by: NURSE ANESTHETIST, CERTIFIED REGISTERED

## 2025-04-15 PROCEDURE — 3700000002 HC GENERAL ANESTHESIA TIME - EACH INCREMENTAL 1 MINUTE: Performed by: UROLOGY

## 2025-04-15 PROCEDURE — 7100000002 HC RECOVERY ROOM TIME - EACH INCREMENTAL 1 MINUTE: Performed by: UROLOGY

## 2025-04-15 PROCEDURE — 2500000004 HC RX 250 GENERAL PHARMACY W/ HCPCS (ALT 636 FOR OP/ED): Performed by: UROLOGY

## 2025-04-15 PROCEDURE — 7100000009 HC PHASE TWO TIME - INITIAL BASE CHARGE: Performed by: UROLOGY

## 2025-04-15 PROCEDURE — A52356 PR CYSTO/URETERO W/LITHOTRIPSY  AND INDWELL STENT INSRT: Performed by: ANESTHESIOLOGY

## 2025-04-15 PROCEDURE — 3600000003 HC OR TIME - INITIAL BASE CHARGE - PROCEDURE LEVEL THREE: Performed by: UROLOGY

## 2025-04-15 PROCEDURE — 3600000008 HC OR TIME - EACH INCREMENTAL 1 MINUTE - PROCEDURE LEVEL THREE: Performed by: UROLOGY

## 2025-04-15 PROCEDURE — 2500000001 HC RX 250 WO HCPCS SELF ADMINISTERED DRUGS (ALT 637 FOR MEDICARE OP): Performed by: ANESTHESIOLOGY

## 2025-04-15 PROCEDURE — 2720000007 HC OR 272 NO HCPCS: Performed by: UROLOGY

## 2025-04-15 PROCEDURE — A52356 PR CYSTO/URETERO W/LITHOTRIPSY  AND INDWELL STENT INSRT: Performed by: NURSE ANESTHETIST, CERTIFIED REGISTERED

## 2025-04-15 PROCEDURE — C1894 INTRO/SHEATH, NON-LASER: HCPCS | Performed by: UROLOGY

## 2025-04-15 PROCEDURE — RXMED WILLOW AMBULATORY MEDICATION CHARGE

## 2025-04-15 PROCEDURE — 82365 CALCULUS SPECTROSCOPY: CPT | Performed by: UROLOGY

## 2025-04-15 PROCEDURE — 7100000001 HC RECOVERY ROOM TIME - INITIAL BASE CHARGE: Performed by: UROLOGY

## 2025-04-15 PROCEDURE — 81025 URINE PREGNANCY TEST: CPT | Performed by: UROLOGY

## 2025-04-15 RX ORDER — ALBUTEROL SULFATE 0.83 MG/ML
2.5 SOLUTION RESPIRATORY (INHALATION) ONCE AS NEEDED
Status: DISCONTINUED | OUTPATIENT
Start: 2025-04-15 | End: 2025-04-15 | Stop reason: HOSPADM

## 2025-04-15 RX ORDER — LIDOCAINE HYDROCHLORIDE 20 MG/ML
INJECTION, SOLUTION INFILTRATION; PERINEURAL AS NEEDED
Status: DISCONTINUED | OUTPATIENT
Start: 2025-04-15 | End: 2025-04-15

## 2025-04-15 RX ORDER — PROPOFOL 10 MG/ML
INJECTION, EMULSION INTRAVENOUS AS NEEDED
Status: DISCONTINUED | OUTPATIENT
Start: 2025-04-15 | End: 2025-04-15

## 2025-04-15 RX ORDER — HYDRALAZINE HYDROCHLORIDE 20 MG/ML
5 INJECTION INTRAMUSCULAR; INTRAVENOUS EVERY 30 MIN PRN
Status: DISCONTINUED | OUTPATIENT
Start: 2025-04-15 | End: 2025-04-15 | Stop reason: HOSPADM

## 2025-04-15 RX ORDER — FENTANYL CITRATE 50 UG/ML
12.5 INJECTION, SOLUTION INTRAMUSCULAR; INTRAVENOUS EVERY 5 MIN PRN
Status: DISCONTINUED | OUTPATIENT
Start: 2025-04-15 | End: 2025-04-15 | Stop reason: HOSPADM

## 2025-04-15 RX ORDER — LIDOCAINE HYDROCHLORIDE 10 MG/ML
0.1 INJECTION, SOLUTION INFILTRATION; PERINEURAL ONCE
Status: DISCONTINUED | OUTPATIENT
Start: 2025-04-15 | End: 2025-04-15 | Stop reason: HOSPADM

## 2025-04-15 RX ORDER — ONDANSETRON HYDROCHLORIDE 2 MG/ML
INJECTION, SOLUTION INTRAVENOUS AS NEEDED
Status: DISCONTINUED | OUTPATIENT
Start: 2025-04-15 | End: 2025-04-15

## 2025-04-15 RX ORDER — MIDAZOLAM HYDROCHLORIDE 1 MG/ML
INJECTION, SOLUTION INTRAMUSCULAR; INTRAVENOUS AS NEEDED
Status: DISCONTINUED | OUTPATIENT
Start: 2025-04-15 | End: 2025-04-15

## 2025-04-15 RX ORDER — MIDAZOLAM HYDROCHLORIDE 1 MG/ML
1 INJECTION, SOLUTION INTRAMUSCULAR; INTRAVENOUS ONCE AS NEEDED
Status: DISCONTINUED | OUTPATIENT
Start: 2025-04-15 | End: 2025-04-15 | Stop reason: HOSPADM

## 2025-04-15 RX ORDER — SODIUM CHLORIDE, SODIUM LACTATE, POTASSIUM CHLORIDE, CALCIUM CHLORIDE 600; 310; 30; 20 MG/100ML; MG/100ML; MG/100ML; MG/100ML
100 INJECTION, SOLUTION INTRAVENOUS CONTINUOUS
Status: DISCONTINUED | OUTPATIENT
Start: 2025-04-15 | End: 2025-04-15 | Stop reason: HOSPADM

## 2025-04-15 RX ORDER — ONDANSETRON HYDROCHLORIDE 2 MG/ML
4 INJECTION, SOLUTION INTRAVENOUS ONCE AS NEEDED
Status: DISCONTINUED | OUTPATIENT
Start: 2025-04-15 | End: 2025-04-15 | Stop reason: HOSPADM

## 2025-04-15 RX ORDER — HYDROMORPHONE HYDROCHLORIDE 1 MG/ML
1 INJECTION, SOLUTION INTRAMUSCULAR; INTRAVENOUS; SUBCUTANEOUS EVERY 5 MIN PRN
Status: DISCONTINUED | OUTPATIENT
Start: 2025-04-15 | End: 2025-04-15 | Stop reason: HOSPADM

## 2025-04-15 RX ORDER — FENTANYL CITRATE 50 UG/ML
INJECTION, SOLUTION INTRAMUSCULAR; INTRAVENOUS AS NEEDED
Status: DISCONTINUED | OUTPATIENT
Start: 2025-04-15 | End: 2025-04-15

## 2025-04-15 RX ORDER — TRAMADOL HYDROCHLORIDE 50 MG/1
50 TABLET ORAL 3 TIMES DAILY PRN
Qty: 15 TABLET | Refills: 0 | Status: SHIPPED | OUTPATIENT
Start: 2025-04-15 | End: 2025-04-20

## 2025-04-15 RX ORDER — OXYCODONE HYDROCHLORIDE 5 MG/1
5 TABLET ORAL EVERY 4 HOURS PRN
Status: DISCONTINUED | OUTPATIENT
Start: 2025-04-15 | End: 2025-04-15 | Stop reason: HOSPADM

## 2025-04-15 RX ORDER — ACETAMINOPHEN 325 MG/1
975 TABLET ORAL ONCE
Status: DISCONTINUED | OUTPATIENT
Start: 2025-04-15 | End: 2025-04-15 | Stop reason: HOSPADM

## 2025-04-15 RX ORDER — CIPROFLOXACIN 2 MG/ML
400 INJECTION, SOLUTION INTRAVENOUS ONCE
Status: COMPLETED | OUTPATIENT
Start: 2025-04-15 | End: 2025-04-15

## 2025-04-15 RX ORDER — DIPHENHYDRAMINE HYDROCHLORIDE 50 MG/ML
12.5 INJECTION, SOLUTION INTRAMUSCULAR; INTRAVENOUS ONCE AS NEEDED
Status: DISCONTINUED | OUTPATIENT
Start: 2025-04-15 | End: 2025-04-15 | Stop reason: HOSPADM

## 2025-04-15 RX ADMIN — PROPOFOL 200 MG: 10 INJECTION, EMULSION INTRAVENOUS at 13:57

## 2025-04-15 RX ADMIN — HYDROMORPHONE HYDROCHLORIDE 0.5 MG: 1 INJECTION, SOLUTION INTRAMUSCULAR; INTRAVENOUS; SUBCUTANEOUS at 15:20

## 2025-04-15 RX ADMIN — DEXAMETHASONE SODIUM PHOSPHATE 4 MG: 4 INJECTION INTRA-ARTICULAR; INTRALESIONAL; INTRAMUSCULAR; INTRAVENOUS; SOFT TISSUE at 14:10

## 2025-04-15 RX ADMIN — OXYCODONE HYDROCHLORIDE 5 MG: 5 TABLET ORAL at 16:11

## 2025-04-15 RX ADMIN — ONDANSETRON 4 MG: 2 INJECTION, SOLUTION INTRAMUSCULAR; INTRAVENOUS at 14:10

## 2025-04-15 RX ADMIN — SODIUM CHLORIDE, POTASSIUM CHLORIDE, SODIUM LACTATE AND CALCIUM CHLORIDE: 600; 310; 30; 20 INJECTION, SOLUTION INTRAVENOUS at 13:51

## 2025-04-15 RX ADMIN — FENTANYL CITRATE 50 MCG: 50 INJECTION, SOLUTION INTRAMUSCULAR; INTRAVENOUS at 14:23

## 2025-04-15 RX ADMIN — CIPROFLOXACIN 400 MG: 400 INJECTION, SOLUTION INTRAVENOUS at 14:05

## 2025-04-15 RX ADMIN — FENTANYL CITRATE 50 MCG: 50 INJECTION, SOLUTION INTRAMUSCULAR; INTRAVENOUS at 13:58

## 2025-04-15 RX ADMIN — MIDAZOLAM 2 MG: 1 INJECTION INTRAMUSCULAR; INTRAVENOUS at 13:51

## 2025-04-15 RX ADMIN — FENTANYL CITRATE 12.5 MCG: 50 INJECTION, SOLUTION INTRAMUSCULAR; INTRAVENOUS at 15:41

## 2025-04-15 RX ADMIN — LIDOCAINE HYDROCHLORIDE 60 MG: 20 INJECTION, SOLUTION INFILTRATION; PERINEURAL at 13:58

## 2025-04-15 ASSESSMENT — PAIN SCALES - GENERAL
PAINLEVEL_OUTOF10: 9
PAINLEVEL_OUTOF10: 0 - NO PAIN
PAINLEVEL_OUTOF10: 0 - NO PAIN
PAINLEVEL_OUTOF10: 7
PAINLEVEL_OUTOF10: 7

## 2025-04-15 ASSESSMENT — COLUMBIA-SUICIDE SEVERITY RATING SCALE - C-SSRS
1. IN THE PAST MONTH, HAVE YOU WISHED YOU WERE DEAD OR WISHED YOU COULD GO TO SLEEP AND NOT WAKE UP?: NO
2. HAVE YOU ACTUALLY HAD ANY THOUGHTS OF KILLING YOURSELF?: NO
6. HAVE YOU EVER DONE ANYTHING, STARTED TO DO ANYTHING, OR PREPARED TO DO ANYTHING TO END YOUR LIFE?: NO

## 2025-04-15 ASSESSMENT — PAIN - FUNCTIONAL ASSESSMENT
PAIN_FUNCTIONAL_ASSESSMENT: 0-10
PAIN_FUNCTIONAL_ASSESSMENT: 0-10

## 2025-04-15 ASSESSMENT — PAIN DESCRIPTION - ORIENTATION: ORIENTATION: RIGHT

## 2025-04-15 ASSESSMENT — PAIN DESCRIPTION - LOCATION: LOCATION: BACK

## 2025-04-15 ASSESSMENT — PAIN SCALES - PAIN ASSESSMENT IN ADVANCED DEMENTIA (PAINAD)
TOTALSCORE: MEDICATION (SEE MAR)
TOTALSCORE: MEDICATION (SEE MAR)

## 2025-04-15 NOTE — ANESTHESIA POSTPROCEDURE EVALUATION
Patient: Suzie Avila    Procedure Summary       Date: 04/15/25 Room / Location: [facility] OR 10 / Virtual [facility] OR    Anesthesia Start: 1351 Anesthesia Stop: 1445    Procedure: RIGHT URETEROSCOPY FLEX VS RIGID WITH LASER AND CYSTO WITH STENT (Right) Diagnosis:       Calculus of kidney      (Calculus of kidney [N20.0])    Surgeons: Tim Kaiser MD Responsible Provider: Dexter Arreola DO    Anesthesia Type: general ASA Status: 3            Anesthesia Type: general    Vitals Value Taken Time   /79 04/15/25 1600   Temp 36.5 °C (97.7 °F) 04/15/25 1541   Pulse 65 04/15/25 1607   Resp 18 04/15/25 1607   SpO2 97 % 04/15/25 1607   Vitals shown include unfiled device data.    Anesthesia Post Evaluation    Patient location during evaluation: PACU  Patient participation: complete - patient participated  Level of consciousness: awake and alert  Pain management: adequate  Airway patency: patent  Cardiovascular status: acceptable  Respiratory status: acceptable  Hydration status: acceptable  Postoperative Nausea and Vomiting: none        There were no known notable events for this encounter.

## 2025-04-15 NOTE — ANESTHESIA PREPROCEDURE EVALUATION
Patient: Suzie Avila    Procedure Information       Date/Time: 04/15/25 0060    Procedure: RIGHT URETEROSCOPY FLEX VS RIGID WITH LASER AND CYSTO WITH STENT (Right)    Location: [facility] OR 10 / Virtual [facility] OR    Surgeons: Tim Kaiser MD            Relevant Problems   Hematology   (+) Anemia       Clinical information reviewed:    Allergies                NPO Detail:  No data recorded     Physical Exam    Airway  Mallampati: II  TM distance: >3 FB  Neck ROM: full     Cardiovascular - normal exam     Dental    Pulmonary - normal exam     Abdominal - normal exam  (+) obese             Anesthesia Plan    History of general anesthesia?: yes  History of complications of general anesthesia?: no    ASA 3     general     intravenous induction   Anesthetic plan and risks discussed with patient.    Plan discussed with CRNA.

## 2025-04-15 NOTE — OP NOTE
RIGHT URETEROSCOPY FLEX VS RIGID WITH LASER AND CYSTO     Date: 4/15/2025  OR Location: [facility] OR    Name: Suzie Avila : 1980, Age: 45 y.o., MRN: [id number], Sex: female    Diagnosis  Pre-op Diagnosis      * Calculus of kidney [N20.0] Post-op Diagnosis     * Calculus of kidney [N20.0]     Procedures  RIGHT URETEROSCOPY FLEX VS RIGID WITH LASER AND CYSTO WITH STENT  64179 - [place] CYSTO/URETERO W/LITHOTRIPSY &INDWELL STENT INSRT      Surgeons      * Tim Kaiser - Primary    Resident/Fellow/Other Assistant:  Surgeons and Role:  * No surgeons found with a matching role *    Staff:   Surgical Assistant:   Circulator: Chavo Prado Circulator: Agustin Avila Person: Arnie    Anesthesia Staff: Anesthesiologist: DO KEI Limon-AA: DEJUAN Simmons    Procedure Summary  Anesthesia: Anesthesia type not filed in the log.  ASA: III  Estimated Blood Loss: mL  Intra-op Medications:   Administrations occurring from 1410 to 1520 on 04/15/25:   Medication Name Total Dose   dexAMETHasone (Decadron) injection 4 mg/mL 4 mg   fentaNYL (Sublimaze) injection 50 mcg/mL 50 mcg   ondansetron (Zofran) 2 mg/mL injection 4 mg              Anesthesia Record               Intraprocedure I/O Totals       None           Specimen:   ID Type Source Tests Collected by Time   A :  Calculus Urine, Clean Catch CALCULI (STONE) ANALYSIS Tim Kaiser MD 4/15/2025 1430                 Drains and/or Catheters: * None in log *    Tourniquet Times:         Implants:     Findings:     Indications: Suzie Avila is an 45 y.o. female who is having surgery for Calculus of kidney [N20.0].     The patient was seen in the preoperative area. The risks, benefits, complications, treatment options, non-operative alternatives, expected recovery and outcomes were discussed with the patient. The possibilities of reaction to medication, pulmonary aspiration, injury to surrounding structures, bleeding, recurrent infection, the need for  additional procedures, failure to diagnose a condition, and creating a complication requiring transfusion or operation were discussed with the patient. The patient concurred with the proposed plan, giving informed consent.  The site of surgery was properly noted/marked if necessary per policy. The patient has been actively warmed in preoperative area. Preoperative antibiotics have been ordered and given within 1 hours of incision. Venous thrombosis prophylaxis have been ordered including bilateral sequential compression devices    Procedure Details:            Prediagnosis-right kidney stone    Postoperative diagnosis-same    Procedure performed-right flexible ureteroscopy with holmium laser stone extraction stone no stent was placed    Anesthesia-General    Indications-patient with a long history of stones with [facility] urology status post multiple left-sided stone surgery mostly cleared saw me for a second opinion in regards to a 5 mm right lower pole stone.  Patient felt she was having flank pain attributable to the stone but there is no hydronephrosis.  I long discussion with the patient my office in regards to this and I informed her that I doubt the stone was the source of pain but she insisted on having procedure.  She also had some urinary issues she relates to previous stone surgeries which I did not feel were related either.  Either way we decided proceed with the surgery and all risk benefits alternative discussed.    Findings-patient is down his right lower pole more on the order of 8 to 9 mm soft likely calcium phosphate stone dusted extensively into submillimeter pieces which went very well and the largest piece was extracted and sent for analysis no stent was placed bladder showed element of a cystocele but no other abnormalities were noted    Summary of procedure patient was prepped and draped in usual sterile fashion given IV Cipro placed in dorsal position all pressure points padded pannus  performed.  8 [place] catheter gently Romano right ureteral orifice angled wire of the right kidney over which a 10 dilator apparatus was placed second wire in the right kidney.  2 wires in place flex ureteroscope was easily passed stone was seen in the right lower pole and the 200 µm laser fiber was used primary setting of 50 and 0.2 extensively dusted and then the ZeroTip basket was then removed to remove the largest piece this was brought back down the ureter the ureter was found to be intact the safety wire was removed and there was good E flux from the right ureteral orifice and therefore no stent was placed.  She had her bladder drained and woken anesthesia good condition.  Patient will see me back in approximately 6 weeks        Tim Kaiser  Phone Number: [phone]

## 2025-04-15 NOTE — ANESTHESIA PROCEDURE NOTES
Airway  Date/Time: 4/15/2025 1:59 PM  Urgency: elective    Airway not difficult    Staffing  Performed: DEJUAN   Authorized by: Dexter Arreola DO    Performed by: DEJUAN Simmons  Patient location during procedure: OR    Indications and Patient Condition  Indications for airway management: anesthesia  Spontaneous Ventilation: absent  Sedation level: deep  Preoxygenated: yes  Patient position: sniffing  MILS maintained throughout  Mask difficulty assessment: 0 - not attempted  Planned trial extubation    Final Airway Details  Final airway type: supraglottic airway      Successful airway: Supreme  Size 5     Number of attempts at approach: 1  Ventilation between attempts: none

## 2025-04-18 ENCOUNTER — APPOINTMENT (OUTPATIENT)
Dept: RADIOLOGY | Facility: CLINIC | Age: 45
End: 2025-04-18
Payer: MEDICAID

## 2025-04-20 LAB
APPEARANCE STONE: NORMAL
COMPN STONE: NORMAL
SPECIMEN WT: NORMAL MG

## 2025-04-23 ENCOUNTER — APPOINTMENT (OUTPATIENT)
Facility: CLINIC | Age: 45
End: 2025-04-23
Payer: MEDICAID

## 2025-04-28 ENCOUNTER — APPOINTMENT (OUTPATIENT)
Dept: RADIOLOGY | Facility: CLINIC | Age: 45
End: 2025-04-28
Payer: MEDICAID

## 2025-04-28 DIAGNOSIS — M23.91 ACUTE INTERNAL DERANGEMENT OF RIGHT KNEE: ICD-10-CM

## 2025-04-28 PROCEDURE — 73721 MRI JNT OF LWR EXTRE W/O DYE: CPT | Mod: RT

## 2025-04-28 PROCEDURE — 73721 MRI JNT OF LWR EXTRE W/O DYE: CPT | Mod: RIGHT SIDE | Performed by: RADIOLOGY

## 2025-04-30 ENCOUNTER — APPOINTMENT (OUTPATIENT)
Facility: CLINIC | Age: 45
End: 2025-04-30
Payer: MEDICAID

## 2025-04-30 VITALS
BODY MASS INDEX: 33.5 KG/M2 | DIASTOLIC BLOOD PRESSURE: 93 MMHG | SYSTOLIC BLOOD PRESSURE: 143 MMHG | HEIGHT: 70 IN | HEART RATE: 72 BPM | WEIGHT: 234 LBS | TEMPERATURE: 98.2 F

## 2025-04-30 DIAGNOSIS — M25.541 ARTHRALGIA OF BOTH HANDS: ICD-10-CM

## 2025-04-30 DIAGNOSIS — M35.00 SICCA, UNSPECIFIED TYPE (MULTI): ICD-10-CM

## 2025-04-30 DIAGNOSIS — M25.542 ARTHRALGIA OF BOTH HANDS: ICD-10-CM

## 2025-04-30 DIAGNOSIS — M79.7 FIBROMYALGIA: Primary | ICD-10-CM

## 2025-04-30 PROCEDURE — 3008F BODY MASS INDEX DOCD: CPT | Performed by: INTERNAL MEDICINE

## 2025-04-30 PROCEDURE — 99214 OFFICE O/P EST MOD 30 MIN: CPT | Performed by: INTERNAL MEDICINE

## 2025-04-30 NOTE — PATIENT INSTRUCTIONS
I am referring you to pain medicine doctors and [facility] for fibromyalgia  Continue seeing PCP for managing the anxiety/depression    Follow up with rheumatology as needed     
DISPLAY PLAN FREE TEXT

## 2025-04-30 NOTE — PROGRESS NOTES
Subjective   Patient ID: [id number]   Suzie Avila is a 45 y.o. female who presents for Follow-up.  HPI    Diagnosis - Nociceptive pain/ Fibromyalgia   Rule out inflammatory arthralgias    Interval history 5.1  Feeling worse  Dealing with a lot of stress -  is critically sick  Patient was in low spirits today, has predominant widespread areas of aches and pains, fatigue, brain fog, hardly sleeping because of her sick  who is currently hospitalized  Autoimmune work up - negative    RECALL  History of Severe anemia, SAD, Hashimoto thyroiditis, Renal stones, POTS, Cervical spondylosis and Facet arthropathy ( CT Cervical  is normal)  POTS -   Arthralgias > 1 year  Stiffness with some diurnal variation  Swelling of the hands intermittently  Difficlty making a fist, losing dexterity in the hands, sometimes drops things for this reason, at times feels hands are swollen  At times loses control of her ankles, when they go out on her and roll  Sicca symptoms - with excessive dryness and choking spells  Mostly with solids   Dental decay - early onset,   Feels tongue swells and feels burnt  Brtille hair , hair loss - no bald patches  Hair changing color  Gaining weight - 30-35 lbs in the last year  Brain fog - loss of memory    Anxiety + depression  On prozac and atavan  Not actively working  No family hx of AI diseases    ROS  10 system review otherwise neg unless aforementioned      Patient Active Problem List   Diagnosis    Anemia        Past Medical History:   Diagnosis Date    Anemia     Anxiety     Bilateral renal stones     Depression     Hyperlipidemia     Postural orthostatic tachycardia syndrome (POTS)     Seasonal affective disorder     Vitamin B12 deficiency     Vitamin D deficiency         Past Surgical History:   Procedure Laterality Date    CARDIAC CATHETERIZATION      NO STENTS     SECTION, CLASSIC      DENTAL SURGERY      OTHER SURGICAL HISTORY      Multiple kidney stone removal  surgeries    TONSILLECTOMY      TYMPANOSTOMY TUBE PLACEMENT          Social History     Socioeconomic History    Marital status:      Spouse name: Not on file    Number of children: Not on file    Years of education: Not on file    Highest education level: Not on file   Occupational History    Not on file   Tobacco Use    Smoking status: Every Day     Current packs/day: 1.00     Types: Cigarettes    Smokeless tobacco: Never   Vaping Use    Vaping status: Never Used   Substance and Sexual Activity    Alcohol use: Never    Drug use: Never    Sexual activity: Not on file   Other Topics Concern    Not on file   Social History Narrative    Not on file     Social Drivers of Health     Financial Resource Strain: Not on file   Food Insecurity: Not on file   Transportation Needs: Not on file   Physical Activity: Not on file   Stress: Not on file   Social Connections: Not on file   Intimate Partner Violence: Not on file   Housing Stability: Not on file        Allergies   Allergen Reactions    Gabapentin Swelling     Causes joints to swell     Morphine Other     SEVERE MUSCLE CONTRACTIONS    Topiramate Other     KIDNEY STONES          Current Outpatient Medications:     butalbital-acetaminophen-caff -40 mg tablet, Take 2 tablets by mouth every 4 hours if needed for headaches., Disp: , Rfl:     fexofenadine-pseudoephedrine (Allegra-D 24) 180-240 mg 24 hr tablet, Take 1 tablet by mouth once daily. Do not crush, chew, or split., Disp: , Rfl:     FLUoxetine (PROzac) 40 mg capsule, Take 1 capsule (40 mg) by mouth once daily., Disp: , Rfl:     LORazepam (Ativan) 1 mg tablet, Take 1 tablet (1 mg) by mouth every 6 hours if needed for anxiety., Disp: , Rfl:     meloxicam (Mobic) 15 mg tablet, Take 1 tablet (15 mg) by mouth once daily. (Patient not taking: Reported on 4/30/2025), Disp: 30 tablet, Rfl: 0    ondansetron ODT (Zofran-ODT) 4 mg disintegrating tablet, Dissolve 1 tablet (4 mg) in the mouth every 8 hours if needed  for nausea or vomiting for up to 15 doses., Disp: 15 tablet, Rfl: 0    oxyCODONE-acetaminophen (Percocet) 5-325 mg tablet, Take 1 tablet by mouth every 6 hours if needed for severe pain (7 - 10) for up to 12 doses. (Patient not taking: Reported on 3/19/2025), Disp: 12 tablet, Rfl: 0    progesterone (Prometrium) 100 mg capsule, Take 1 capsule (100 mg) by mouth once daily., Disp: , Rfl:     pseudoephedrine (Sudafed) 30 mg tablet, Take 1 tablet (30 mg) by mouth every 4 hours if needed for congestion., Disp: , Rfl:     rosuvastatin (Crestor) 20 mg tablet, Take 1 tablet (20 mg) by mouth once daily., Disp: , Rfl:     vibegron (Gemtesa) 75 mg tablet, Take 1 tablet (75 mg) by mouth once daily., Disp: , Rfl:     ZOLMitriptan (Zomig) 2.5 mg tablet, Take 1 tablet (2.5 mg) by mouth 1 time if needed for migraine. May repeat in 2 hours if unresolved. Do not exceed 10 mg in 24 hours., Disp: , Rfl:        Objective     Visit Vitals  BP (!) 143/93   Pulse 72   Temp 36.8 °C (98.2 °F)        Physical Exam    General: AAOx3, Cooperative  Head: normocephalic, atraumatic  Eyes: EOMI, conjunctiva clear, sclera white, anicteric  Ears: no redness, swelling, tenderness    Neuro: CN II-XII grossly intact, no focal deficit  Skin: No rashes, ulcers or photosensitive areas  MSK: Upper Extremities:  Hand/Fingers: No erythema, swelling, tenderness or warmth at DIP, PIP, or MCP joints, FROM grossly. Good hand . No nodules. No deformities   Wrists: No erythema, swelling, warmth or tenderness at wrist, FROM grossly  Elbows: No tenderness, swelling, erythema or warmth at elbows, FROM grossly. No nodules   Shoulders: No swelling, erythema, tenderness or warmth at shoulders. FROM  Lower Extremities:   Hips: No obvious deformities. No joint tenderness, normal ROM grossly. Log roll test negative bilaterally. Katerina test is negative bilaterally. No trochanteric bursae TTP  Knees: No tenderness, deformities, swelling, rashes, or warmth, normal ROM  "grossly. No crepitus, no pes anserine bursa TTP   Ankles: No deformities, tenderness, edema, erythema, ulceration, or warmth at the ankle  Feet: Negative MTP squeeze. Normal ROM grossly.   Spine: No spinal tenderness to palpation. No SI joint tenderness. Gaenslen test negative           Lab Results   Component Value Date    WBC 7.1 02/25/2025    HGB 11.6 (L) 02/25/2025    HCT 37.5 02/25/2025    MCV 88 02/25/2025     (L) 02/25/2025        Chemistry    Lab Results   Component Value Date/Time     04/07/2025 1112    K 4.0 04/07/2025 1112     04/07/2025 1112    CO2 27 04/07/2025 1112    BUN 10 04/07/2025 1112    CREATININE 0.66 04/07/2025 1112    Lab Results   Component Value Date/Time    CALCIUM 8.6 04/07/2025 1112    ALKPHOS 124 (H) 02/25/2025 1915    AST 21 02/25/2025 1915    ALT 17 02/25/2025 1915    BILITOT 0.3 02/25/2025 1915           Lab Results   Component Value Date    CRP 4.6 03/19/2025      Lab Results   Component Value Date    HENNY NEGATIVE 03/19/2025    SEDRATE 19 03/19/2025      Lab Results   Component Value Date    CKTOTAL 33 07/25/2023     Lab Results   Component Value Date    NEUTROABS 5.04 02/08/2025      No results found for: \"FERRITIN\"   Lab Results   Component Value Date    HEPCAB NON-REACTIVE 03/19/2025      Lab Results   Component Value Date    ALT 17 02/25/2025    AST 21 02/25/2025    ALKPHOS 124 (H) 02/25/2025    BILITOT 0.3 02/25/2025      No results found for: \"PPD\"   No results found for: \"URICACID\"   Lab Results   Component Value Date    CALCIUM 8.6 04/07/2025      Lab Results   Component Value Date    SPEP Normal. 03/19/2025      No results found for: \"ALBUR\", \"[id number]\"   .last          MR knee right wo IV contrast  Narrative: Interpreted By:  Nat Sherwood,   STUDY:  MRI of the right knee with out contrast      INDICATION:  Signs/Symptoms:internal derangement      COMPARISON:  Knee radiographs 03/29/2025.      ACCESSION NUMBER(S):  [id number]      ORDERING " CLINICIAN:  JENNIFER ANN      TECHNIQUE:  Multiplanar multisequence MRI of the right knee was performed without  intravenous contrast.      FINDINGS:  Menisci:  No meniscal tear.      Cruciate ligaments: Intact.      Collateral ligaments: Intact.      Tendons:  The tendons including the extensor mechanism are intact.      Muscles: Intact.      Bones:  No evidence of acute fracture. Bone marrow signal intensity  is within normal limits.      Articular cartilage:  Medial compartment: Mild thinning of the anterior weight-bearing  medial femoral condyle cartilage. Patellofemoral compartment: Focal  small area of mild chondral thinning in the central median patellar  ridge, measuring up to 7 mm in craniocaudal. Lateral compartment:  Small but full-thickness chondral loss of the central weight-bearing  lateral femoral condyle measuring 3 x 7 mm in AP and transverse.      Miscellaneous: None.      Impression: 1. No acute internal derangement of the right knee.  2. Tricompartmental degenerative changes with chondral loss as above.      MACRO:  None.      Signed by: Nat Sherwood 4/28/2025 9:33 AM  Dictation workstation:   [id number]     === 02/25/25 ===    XR CHEST 1 VIEW    - Impression -  1.  No evidence of acute cardiopulmonary process.        MACRO:  None    Signed by: Chris Alejandro 2/25/2025 9:02 PM  Dictation workstation:   [id number]             Assessment/Plan   Diagnoses and all orders for this visit:  Fibromyalgia/ Nociceptive pain  HENNY/RF/ACPA negative  SPEP negative  Had hx of sicca symptoms ; serologies and other labs not suggestive of Sjogrens  Also Parotid Ultrasound has no features of Sjogrens Syndrome - biopsy deferred     Mild chronic anemia and mildly low platelets last visit - need to repeat to ensure resolution, unsure if she had a viral illness then  No clinical evidence of inflammatory arthritis  Has hx of anxiety/depression and now dealing with a lot of stress  Counseling provided, advised  to follow up with pain management [facility] other modalities of Rx for fibromyalgia           Binh Llamas MD FACR   of Medicine  [facility] - Department of Rheumatology  [facility]   Plan, including risks and benefits, was discussed with the patient, informed on how to reach us.     To schedule an appointment, call  [phone] [place] or call [phone] for [facility] (formerly [facility])[3]

## 2025-05-01 PROBLEM — M79.7 FIBROMYALGIA: Status: ACTIVE | Noted: 2025-05-01

## 2025-05-01 PROBLEM — M35.00 SICCA: Status: ACTIVE | Noted: 2025-05-01

## 2025-05-05 ENCOUNTER — HOSPITAL ENCOUNTER (EMERGENCY)
Facility: HOSPITAL | Age: 45
Discharge: HOME | End: 2025-05-06
Attending: STUDENT IN AN ORGANIZED HEALTH CARE EDUCATION/TRAINING PROGRAM
Payer: MEDICAID

## 2025-05-05 DIAGNOSIS — M54.41 ACUTE MIDLINE LOW BACK PAIN WITH BILATERAL SCIATICA: Primary | ICD-10-CM

## 2025-05-05 DIAGNOSIS — M54.42 ACUTE MIDLINE LOW BACK PAIN WITH BILATERAL SCIATICA: Primary | ICD-10-CM

## 2025-05-05 PROCEDURE — 36415 COLL VENOUS BLD VENIPUNCTURE: CPT

## 2025-05-05 PROCEDURE — 85025 COMPLETE CBC W/AUTO DIFF WBC: CPT

## 2025-05-05 PROCEDURE — 80053 COMPREHEN METABOLIC PANEL: CPT

## 2025-05-05 PROCEDURE — 81001 URINALYSIS AUTO W/SCOPE: CPT

## 2025-05-05 PROCEDURE — 84702 CHORIONIC GONADOTROPIN TEST: CPT | Performed by: STUDENT IN AN ORGANIZED HEALTH CARE EDUCATION/TRAINING PROGRAM

## 2025-05-05 PROCEDURE — 99285 EMERGENCY DEPT VISIT HI MDM: CPT | Performed by: STUDENT IN AN ORGANIZED HEALTH CARE EDUCATION/TRAINING PROGRAM

## 2025-05-05 RX ORDER — MIRABEGRON 50 MG/1
1 TABLET, FILM COATED, EXTENDED RELEASE ORAL
COMMUNITY
Start: 2025-04-25

## 2025-05-05 ASSESSMENT — PAIN DESCRIPTION - DIRECTION: RADIATING_TOWARDS: B/L BUTTOCKS

## 2025-05-05 ASSESSMENT — COLUMBIA-SUICIDE SEVERITY RATING SCALE - C-SSRS
6. HAVE YOU EVER DONE ANYTHING, STARTED TO DO ANYTHING, OR PREPARED TO DO ANYTHING TO END YOUR LIFE?: NO
1. IN THE PAST MONTH, HAVE YOU WISHED YOU WERE DEAD OR WISHED YOU COULD GO TO SLEEP AND NOT WAKE UP?: NO
2. HAVE YOU ACTUALLY HAD ANY THOUGHTS OF KILLING YOURSELF?: NO

## 2025-05-05 ASSESSMENT — LIFESTYLE VARIABLES
EVER FELT BAD OR GUILTY ABOUT YOUR DRINKING: NO
HAVE PEOPLE ANNOYED YOU BY CRITICIZING YOUR DRINKING: NO
HAVE YOU EVER FELT YOU SHOULD CUT DOWN ON YOUR DRINKING: NO
TOTAL SCORE: 0
EVER HAD A DRINK FIRST THING IN THE MORNING TO STEADY YOUR NERVES TO GET RID OF A HANGOVER: NO

## 2025-05-05 ASSESSMENT — PAIN DESCRIPTION - PAIN TYPE: TYPE: ACUTE PAIN

## 2025-05-05 ASSESSMENT — PAIN SCALES - GENERAL: PAINLEVEL_OUTOF10: 10 - WORST POSSIBLE PAIN

## 2025-05-05 ASSESSMENT — PAIN DESCRIPTION - DESCRIPTORS: DESCRIPTORS: SHARP

## 2025-05-05 ASSESSMENT — PAIN - FUNCTIONAL ASSESSMENT: PAIN_FUNCTIONAL_ASSESSMENT: 0-10

## 2025-05-05 ASSESSMENT — PAIN DESCRIPTION - ORIENTATION: ORIENTATION: MID

## 2025-05-05 ASSESSMENT — PAIN DESCRIPTION - LOCATION: LOCATION: BACK

## 2025-05-06 ENCOUNTER — APPOINTMENT (OUTPATIENT)
Dept: RADIOLOGY | Facility: HOSPITAL | Age: 45
End: 2025-05-06
Payer: MEDICAID

## 2025-05-06 VITALS
BODY MASS INDEX: 33.5 KG/M2 | HEIGHT: 70 IN | HEART RATE: 81 BPM | DIASTOLIC BLOOD PRESSURE: 76 MMHG | SYSTOLIC BLOOD PRESSURE: 128 MMHG | OXYGEN SATURATION: 98 % | TEMPERATURE: 97.7 F | WEIGHT: 234 LBS | RESPIRATION RATE: 17 BRPM

## 2025-05-06 LAB
ALBUMIN SERPL BCP-MCNC: 3.9 G/DL (ref 3.4–5)
ALP SERPL-CCNC: 64 U/L (ref 33–110)
ALT SERPL W P-5'-P-CCNC: 11 U/L (ref 7–45)
ANION GAP SERPL CALC-SCNC: 9 MMOL/L (ref 10–20)
APPEARANCE UR: CLEAR
AST SERPL W P-5'-P-CCNC: 14 U/L (ref 9–39)
B-HCG SERPL-ACNC: <2 MIU/ML
BASOPHILS # BLD AUTO: 0.01 X10*3/UL (ref 0–0.1)
BASOPHILS NFR BLD AUTO: 0.1 %
BILIRUB SERPL-MCNC: 0.2 MG/DL (ref 0–1.2)
BILIRUB UR STRIP.AUTO-MCNC: NEGATIVE MG/DL
BUN SERPL-MCNC: 10 MG/DL (ref 6–23)
CALCIUM SERPL-MCNC: 8.5 MG/DL (ref 8.6–10.3)
CHLORIDE SERPL-SCNC: 101 MMOL/L (ref 98–107)
CO2 SERPL-SCNC: 29 MMOL/L (ref 21–32)
COLOR UR: ABNORMAL
CREAT SERPL-MCNC: 0.68 MG/DL (ref 0.5–1.05)
EGFRCR SERPLBLD CKD-EPI 2021: >90 ML/MIN/1.73M*2
EOSINOPHIL # BLD AUTO: 0.17 X10*3/UL (ref 0–0.7)
EOSINOPHIL NFR BLD AUTO: 2.2 %
ERYTHROCYTE [DISTWIDTH] IN BLOOD BY AUTOMATED COUNT: 14.9 % (ref 11.5–14.5)
GLUCOSE SERPL-MCNC: 96 MG/DL (ref 74–99)
GLUCOSE UR STRIP.AUTO-MCNC: NORMAL MG/DL
HCT VFR BLD AUTO: 38.4 % (ref 36–46)
HGB BLD-MCNC: 12.7 G/DL (ref 12–16)
HOLD SPECIMEN: 293
IMM GRANULOCYTES # BLD AUTO: 0.02 X10*3/UL (ref 0–0.7)
IMM GRANULOCYTES NFR BLD AUTO: 0.3 % (ref 0–0.9)
KETONES UR STRIP.AUTO-MCNC: NEGATIVE MG/DL
LEUKOCYTE ESTERASE UR QL STRIP.AUTO: NEGATIVE
LYMPHOCYTES # BLD AUTO: 2.59 X10*3/UL (ref 1.2–4.8)
LYMPHOCYTES NFR BLD AUTO: 33.2 %
MCH RBC QN AUTO: 32.2 PG (ref 26–34)
MCHC RBC AUTO-ENTMCNC: 33.1 G/DL (ref 32–36)
MCV RBC AUTO: 97 FL (ref 80–100)
MONOCYTES # BLD AUTO: 0.55 X10*3/UL (ref 0.1–1)
MONOCYTES NFR BLD AUTO: 7.1 %
MUCOUS THREADS #/AREA URNS AUTO: NORMAL /LPF
NEUTROPHILS # BLD AUTO: 4.45 X10*3/UL (ref 1.2–7.7)
NEUTROPHILS NFR BLD AUTO: 57.1 %
NITRITE UR QL STRIP.AUTO: NEGATIVE
NRBC BLD-RTO: 0 /100 WBCS (ref 0–0)
PH UR STRIP.AUTO: 6 [PH]
PLATELET # BLD AUTO: 149 X10*3/UL (ref 150–450)
POTASSIUM SERPL-SCNC: 3.8 MMOL/L (ref 3.5–5.3)
PROT SERPL-MCNC: 6.3 G/DL (ref 6.4–8.2)
PROT UR STRIP.AUTO-MCNC: NEGATIVE MG/DL
RBC # BLD AUTO: 3.95 X10*6/UL (ref 4–5.2)
RBC # UR STRIP.AUTO: ABNORMAL MG/DL
RBC #/AREA URNS AUTO: NORMAL /HPF
SODIUM SERPL-SCNC: 135 MMOL/L (ref 136–145)
SP GR UR STRIP.AUTO: 1.01
UROBILINOGEN UR STRIP.AUTO-MCNC: NORMAL MG/DL
WBC # BLD AUTO: 7.8 X10*3/UL (ref 4.4–11.3)
WBC #/AREA URNS AUTO: NORMAL /HPF

## 2025-05-06 PROCEDURE — 74177 CT ABD & PELVIS W/CONTRAST: CPT

## 2025-05-06 PROCEDURE — 96375 TX/PRO/DX INJ NEW DRUG ADDON: CPT | Performed by: STUDENT IN AN ORGANIZED HEALTH CARE EDUCATION/TRAINING PROGRAM

## 2025-05-06 PROCEDURE — 72131 CT LUMBAR SPINE W/O DYE: CPT

## 2025-05-06 PROCEDURE — 96374 THER/PROPH/DIAG INJ IV PUSH: CPT | Mod: 59 | Performed by: STUDENT IN AN ORGANIZED HEALTH CARE EDUCATION/TRAINING PROGRAM

## 2025-05-06 PROCEDURE — 2500000004 HC RX 250 GENERAL PHARMACY W/ HCPCS (ALT 636 FOR OP/ED): Performed by: STUDENT IN AN ORGANIZED HEALTH CARE EDUCATION/TRAINING PROGRAM

## 2025-05-06 PROCEDURE — 74177 CT ABD & PELVIS W/CONTRAST: CPT | Performed by: RADIOLOGY

## 2025-05-06 PROCEDURE — 2500000004 HC RX 250 GENERAL PHARMACY W/ HCPCS (ALT 636 FOR OP/ED): Mod: JZ

## 2025-05-06 PROCEDURE — 2550000001 HC RX 255 CONTRASTS: Performed by: STUDENT IN AN ORGANIZED HEALTH CARE EDUCATION/TRAINING PROGRAM

## 2025-05-06 PROCEDURE — 2500000001 HC RX 250 WO HCPCS SELF ADMINISTERED DRUGS (ALT 637 FOR MEDICARE OP): Performed by: STUDENT IN AN ORGANIZED HEALTH CARE EDUCATION/TRAINING PROGRAM

## 2025-05-06 PROCEDURE — 72131 CT LUMBAR SPINE W/O DYE: CPT | Performed by: RADIOLOGY

## 2025-05-06 RX ORDER — KETOROLAC TROMETHAMINE 15 MG/ML
15 INJECTION, SOLUTION INTRAMUSCULAR; INTRAVENOUS ONCE
Status: DISCONTINUED | OUTPATIENT
Start: 2025-05-06 | End: 2025-05-06

## 2025-05-06 RX ORDER — PREDNISONE 10 MG/1
40 TABLET ORAL DAILY
Qty: 20 TABLET | Refills: 0 | Status: SHIPPED | OUTPATIENT
Start: 2025-05-06 | End: 2025-05-11

## 2025-05-06 RX ORDER — METHOCARBAMOL 500 MG/1
500 TABLET, FILM COATED ORAL ONCE
Status: COMPLETED | OUTPATIENT
Start: 2025-05-06 | End: 2025-05-06

## 2025-05-06 RX ORDER — METHOCARBAMOL 500 MG/1
500 TABLET, FILM COATED ORAL 2 TIMES DAILY PRN
Qty: 20 TABLET | Refills: 0 | Status: SHIPPED | OUTPATIENT
Start: 2025-05-06 | End: 2025-05-16

## 2025-05-06 RX ORDER — DIAZEPAM 5 MG/ML
5 INJECTION, SOLUTION INTRAMUSCULAR; INTRAVENOUS ONCE
Status: COMPLETED | OUTPATIENT
Start: 2025-05-06 | End: 2025-05-06

## 2025-05-06 RX ORDER — PREDNISONE 20 MG/1
40 TABLET ORAL ONCE
Status: COMPLETED | OUTPATIENT
Start: 2025-05-06 | End: 2025-05-06

## 2025-05-06 RX ADMIN — METHOCARBAMOL 500 MG: 500 TABLET ORAL at 02:43

## 2025-05-06 RX ADMIN — IOHEXOL 75 ML: 350 INJECTION, SOLUTION INTRAVENOUS at 02:08

## 2025-05-06 RX ADMIN — HYDROMORPHONE HYDROCHLORIDE 0.5 MG: 1 INJECTION, SOLUTION INTRAMUSCULAR; INTRAVENOUS; SUBCUTANEOUS at 00:32

## 2025-05-06 RX ADMIN — DIAZEPAM 5 MG: 10 INJECTION, SOLUTION INTRAMUSCULAR; INTRAVENOUS at 02:44

## 2025-05-06 RX ADMIN — PREDNISONE 40 MG: 20 TABLET ORAL at 02:47

## 2025-05-06 NOTE — DISCHARGE INSTRUCTIONS
You appear to have disc bulge mildly at the level of L3-L4 and L4-L5 that likely is consistent with your symptoms, the shooting pain down your leg when you walk.  We are giving you a burst of steroids, Robaxin as needed for muscle spasm.  We have made a referral to neurosurgery if you would like to see a spine specialist given your pain.  Your CT does not show a kidney stone today.

## 2025-05-06 NOTE — ED PROVIDER NOTES
EMERGENCY DEPARTMENT ENCOUNTER      Pt Name: Suzie Avila  MRN: [id number]  Birthdate 1980  Date of evaluation: 5/5/2025  Provider: Gianni Lr MD    CHIEF COMPLAINT       Chief Complaint   Patient presents with    Back Pain     PT. ARRIVED VIA PRIVATE CAR, RIDE PROVIDED, TO ED FROM HOME FOR MID LOWER BACK PAIN. PT. STATES MID LOWER BACK PAIN STARTED SATURDAY 5/3, IS 10/10, RADIATES DOWN B/L BUTTOCKS. PT. STATES SHE DOESN'T KNOW IF IT'S A KIDNEY STONE, BUT PAIN FEELS LIKE WHEN SHE HAS A KIDNEY STONE       HISTORY OF PRESENT ILLNESS    HPI  45-year-old female who presents to the emergency department with a chief complaint of back pain.  Patient claims on Saturday she developed midline back tenderness that radiated to both flanks.  Patient does have a past medical history significant for kidney stones.  Nursing Notes were reviewed.    PAST MEDICAL HISTORY   Medical History[1]      SURGICAL HISTORY     Surgical History[2]      CURRENT MEDICATIONS       Previous Medications    BUTALBITAL-ACETAMINOPHEN-CAFF -40 MG TABLET    Take 2 tablets by mouth every 4 hours if needed for headaches.    FEXOFENADINE-PSEUDOEPHEDRINE (ALLEGRA-D 24) 180-240 MG 24 HR TABLET    Take 1 tablet by mouth once daily. Do not crush, chew, or split.    FLUOXETINE (PROZAC) 40 MG CAPSULE    Take 1 capsule (40 mg) by mouth once daily.    LORAZEPAM (ATIVAN) 1 MG TABLET    Take 1 tablet (1 mg) by mouth every 6 hours if needed for anxiety.    MELOXICAM (MOBIC) 15 MG TABLET    Take 1 tablet (15 mg) by mouth once daily.    MYRBETRIQ 50 MG TABLET EXTENDED RELEASE 24 HR 24 HR TABLET    Take 1 tablet (50 mg) by mouth early in the morning..    ONDANSETRON ODT (ZOFRAN-ODT) 4 MG DISINTEGRATING TABLET    Dissolve 1 tablet (4 mg) in the mouth every 8 hours if needed for nausea or vomiting for up to 15 doses.    OXYCODONE-ACETAMINOPHEN (PERCOCET) 5-325 MG TABLET    Take 1 tablet by mouth every 6 hours if needed for severe pain (7 - 10) for up to 12  doses.    PROGESTERONE (PROMETRIUM) 100 MG CAPSULE    Take 1 capsule (100 mg) by mouth once daily.    PSEUDOEPHEDRINE (SUDAFED) 30 MG TABLET    Take 1 tablet (30 mg) by mouth every 4 hours if needed for congestion.    ROSUVASTATIN (CRESTOR) 20 MG TABLET    Take 1 tablet (20 mg) by mouth once daily.    VIBEGRON (GEMTESA) 75 MG TABLET    Take 1 tablet (75 mg) by mouth once daily.    ZOLMITRIPTAN (ZOMIG) 2.5 MG TABLET    Take 1 tablet (2.5 mg) by mouth 1 time if needed for migraine. May repeat in 2 hours if unresolved. Do not exceed 10 mg in 24 hours.       ALLERGIES     Gabapentin, Morphine, Topiramate, and Vicodin [hydrocodone-acetaminophen]    FAMILY HISTORY     Family History[3]       SOCIAL HISTORY     Social History[4]    SCREENINGS                        PHYSICAL EXAM    (up to 7 for level 4, 8 or more for level 5)     ED Triage Vitals [05/05/25 2333]   Temperature Heart Rate Respirations BP   36.2 °C (97.2 °F) 77 20 124/82      Pulse Ox Temp Source Heart Rate Source Patient Position   96 % Temporal -- Sitting      BP Location FiO2 (%)     Right arm --       Physical Exam  Constitutional:       General: She is not in acute distress.     Appearance: Normal appearance. She is obese. She is not ill-appearing.   HENT:      Head: Normocephalic and atraumatic.      Nose: Nose normal.      Mouth/Throat:      Mouth: Mucous membranes are moist.      Pharynx: Oropharynx is clear.   Eyes:      Extraocular Movements: Extraocular movements intact.      Pupils: Pupils are equal, round, and reactive to light.   Cardiovascular:      Rate and Rhythm: Normal rate and regular rhythm.      Pulses: Normal pulses.      Heart sounds: Normal heart sounds.   Pulmonary:      Effort: Pulmonary effort is normal.      Breath sounds: Normal breath sounds.   Abdominal:      General: Abdomen is flat. Bowel sounds are normal.   Musculoskeletal:      Thoracic back: Tenderness present.        Back:       Comments: Patient has diffuse midline and  flank tenderness along the lumbar spine.  She indicates that she does have urinary incontinence however this is a chronic issue for her no fecal incontinence patient is able to ambulate with pain.  She has no focal neurological deficit she is able to move all extremity spontaneously she has no numbness or tingling anywhere reflexes are intact in lower extremities.   Skin:     General: Skin is warm.      Capillary Refill: Capillary refill takes less than 2 seconds.   Neurological:      General: No focal deficit present.      Mental Status: She is alert.   Psychiatric:         Mood and Affect: Mood normal.          DIAGNOSTIC RESULTS     LABS:  Labs Reviewed   CBC WITH AUTO DIFFERENTIAL - Abnormal       Result Value    WBC 7.8      nRBC 0.0      RBC 3.95 (*)     Hemoglobin 12.7      Hematocrit 38.4      MCV 97      MCH 32.2      MCHC 33.1      RDW 14.9 (*)     Platelets 149 (*)     Neutrophils % 57.1      Immature Granulocytes %, Automated 0.3      Lymphocytes % 33.2      Monocytes % 7.1      Eosinophils % 2.2      Basophils % 0.1      Neutrophils Absolute 4.45      Immature Granulocytes Absolute, Automated 0.02      Lymphocytes Absolute 2.59      Monocytes Absolute 0.55      Eosinophils Absolute 0.17      Basophils Absolute 0.01     COMPREHENSIVE METABOLIC PANEL - Abnormal    Glucose 96      Sodium 135 (*)     Potassium 3.8      Chloride 101      Bicarbonate 29      Anion Gap 9 (*)     Urea Nitrogen 10      Creatinine 0.68      eGFR >90      Calcium 8.5 (*)     Albumin 3.9      Alkaline Phosphatase 64      Total Protein 6.3 (*)     AST 14      Bilirubin, Total 0.2      ALT 11     URINALYSIS WITH REFLEX CULTURE AND MICROSCOPIC - Abnormal    Color, Urine Light-Yellow      Appearance, Urine Clear      Specific Gravity, Urine 1.014      pH, Urine 6.0      Protein, Urine NEGATIVE      Glucose, Urine Normal      Blood, Urine 0.2 (2+) (*)     Ketones, Urine NEGATIVE      Bilirubin, Urine NEGATIVE      Urobilinogen, Urine  Normal      Nitrite, Urine NEGATIVE      Leukocyte Esterase, Urine NEGATIVE     URINALYSIS WITH REFLEX CULTURE AND MICROSCOPIC    Narrative:     The following orders were created for panel order Urinalysis with Reflex Culture and Microscopic.  Procedure                               Abnormality         Status                     ---------                               -----------         ------                     Urinalysis with Reflex C...[[id number]]  Abnormal            Final result               Extra Urine Gray Tube[[id number]]                            In process                   Please view results for these tests on the individual orders.   EXTRA URINE GRAY TUBE   HUMAN CHORIONIC GONADOTROPIN, SERUM QUANTITATIVE   URINALYSIS MICROSCOPIC WITH REFLEX CULTURE    WBC, Urine 1-5      RBC, Urine 1-2      Mucus, Urine FEW         All other labs were within normal range or not returned as of this dictation.    Imaging  CT abdomen pelvis w IV contrast    (Results Pending)   CT lumbar spine wo IV contrast    (Results Pending)        Procedures  Procedures     EMERGENCY DEPARTMENT COURSE/MDM:   Medical Decision Making  45-year-old female presents emergency department chief complaint of back pain.  Medical management treatment Emergency Department will be due evaluate with CT scan for possible kidney stone.    CT scan shows-  IMPRESSION:  No acute abdominal or pelvic process. Nonacute findings described  above.      No acute fracture or traumatic malalignment of the lumbar spine.      Degenerative changes, most notably resulting in mild-to-moderate  spinal canal stenosis at L4-5.    Patient indicates that she feels well she was able to ambulate.  She does have chronic pain.  Will discharge the patient home with prednisone as well as neurosurgery referral.  The patient be discharged home with strict return precautions.    ED Course as of 05/06/25 0243   Tue May 06, 2025   0236 Patient ambulated with myself and nurse,  reports that she is having shooting pain down her legs, but otherwise has normal strength, non-ataxic gait.  With negative CT imaging, I indicated the patient that I will give her 1 dose of Valium, Robaxin, and did offer spine/neurosurgery referral VS pain referral.  Patient does not want these referrals, I will write for Robaxin as needed []      ED Course User Index  [] Gregg Brito MD         Diagnoses as of 25 0243   Acute midline low back pain with bilateral sciatica        Patient and or family in agreement and understanding of treatment plan.  All questions answered.      I reviewed the case with the attending ED physician. The attending ED physician agrees with the plan. Patient and/or patient´s representative was counseled regarding labs, imaging, likely diagnosis, and plan. All questions were answered.    ED Medications administered this visit:    Medications   HYDROmorphone (Dilaudid) injection 0.5 mg (0.5 mg intravenous Given 25 0032)       New Prescriptions from this visit:    New Prescriptions    No medications on file       Follow-up:  No follow-up provider specified.      Final Impression: No diagnosis found.      (Please note that portions of this note were completed with a voice recognition program.  Efforts were made to edit the dictations but occasionally words are mis-transcribed.)       Gianni Lr MD  Resident  25 0244         [1]   Past Medical History:  Diagnosis Date    Anemia     Anxiety     Bilateral renal stones     Depression     Hyperlipidemia     Postural orthostatic tachycardia syndrome (POTS)     Seasonal affective disorder     Vitamin B12 deficiency     Vitamin D deficiency    [2]   Past Surgical History:  Procedure Laterality Date    CARDIAC CATHETERIZATION      NO STENTS     SECTION, CLASSIC      DENTAL SURGERY      OTHER SURGICAL HISTORY      Multiple kidney stone removal surgeries    TONSILLECTOMY      TYMPANOSTOMY TUBE PLACEMENT     [3]    Family History  Problem Relation Name Age of Onset    Heart disease Father          CABG @ AGE 49    Cancer Maternal Grandmother          LUNG CANCER    Stroke Maternal Grandmother      Diabetes Paternal Grandmother     [4]   Social History  Socioeconomic History    Marital status:    Tobacco Use    Smoking status: Every Day     Current packs/day: 1.00     Types: Cigarettes    Smokeless tobacco: Never   Vaping Use    Vaping status: Never Used   Substance and Sexual Activity    Alcohol use: Never    Drug use: Never        Gregg Brito MD  05/06/25 0610

## 2025-05-14 ENCOUNTER — APPOINTMENT (OUTPATIENT)
Dept: NEUROSURGERY | Facility: CLINIC | Age: 45
End: 2025-05-14
Payer: MEDICAID

## 2025-05-14 VITALS
HEART RATE: 80 BPM | DIASTOLIC BLOOD PRESSURE: 84 MMHG | WEIGHT: 238.5 LBS | SYSTOLIC BLOOD PRESSURE: 116 MMHG | HEIGHT: 70 IN | BODY MASS INDEX: 34.14 KG/M2

## 2025-05-14 DIAGNOSIS — M51.16 LUMBAR DISC HERNIATION WITH RADICULOPATHY: Primary | ICD-10-CM

## 2025-05-14 DIAGNOSIS — M54.41 ACUTE MIDLINE LOW BACK PAIN WITH BILATERAL SCIATICA: ICD-10-CM

## 2025-05-14 DIAGNOSIS — M54.42 ACUTE MIDLINE LOW BACK PAIN WITH BILATERAL SCIATICA: ICD-10-CM

## 2025-05-14 PROCEDURE — 99204 OFFICE O/P NEW MOD 45 MIN: CPT | Performed by: PHYSICIAN ASSISTANT

## 2025-05-14 PROCEDURE — 3008F BODY MASS INDEX DOCD: CPT | Performed by: PHYSICIAN ASSISTANT

## 2025-05-14 ASSESSMENT — PATIENT HEALTH QUESTIONNAIRE - PHQ9
2. FEELING DOWN, DEPRESSED OR HOPELESS: NOT AT ALL
SUM OF ALL RESPONSES TO PHQ9 QUESTIONS 1 & 2: 0
1. LITTLE INTEREST OR PLEASURE IN DOING THINGS: NOT AT ALL

## 2025-05-14 NOTE — PROGRESS NOTES
History of Present Complaint:  The patient was referred to us by Referring Provider: Prince joy. this is 45 y.o.  female complaining by her daughter with a past history of anxiety/depression on lorazepam 1 mg twice daily, obesity BMI 34, migraine headache on Gqersg-Ibxmesub-Zjxf -40, fibromyalgia, lower back pain with bilateral leg pain and numbness left worse than right presenting with significant lower back pain and left leg numbness in the anterior thigh to the knee and shooting pain down to the foot from the back of her thigh to her foot.  The patient stated she cannot stand on it she principally had to use the wheelchair just walk from her car to the office today which is around 100 yards distance.  The patient was given Medrol Dosepak for few days that helped but it is off now.  The patient is allergic to gabapentin cannot use it  The patient has no incontinence no saddle paresthesia no paralysis  The patient denies any red flags    5/14/2025 Prince joy note:  Suzie Avila is a 45 y.o. year old female who presents to the spine clinic with 2 weeks of excruciating low back pain and radiculopathy posteriorly through her left hip and buttocks into her thigh.  She denies any inciting incident leading onset of her symptoms but has had multiple falls over the past 2 weeks.  Secondary to pain in her leg buckling underneath her.  Additional symptom of back spasms as well also extending through back and left thigh.  Numbness bilaterally her toes however more so on left foot.  Symptoms are worsened with standing, sitting, and walking while laying in bed and not moving does reduce the pain the most.  Continues to rate her pain 9/10.  She has sought care in emergency department where pain meds initiated and discharged with methocarbamol and prednisone.  She denies any significant improvement in low back and radicular symptoms.  CT lumbar spine does show disc bulge asymmetric to the left at L4-5.  She denies new  balance disturbance or bowel or bladder incontinence.         Procedures:   None    Portions of record reviewed for pertinent issues: active problem list, medication list, allergies, family history, social history, notes from last encounter, encounters, lab results, imaging and other available records.    I have personally reviewed the OAR[facility] report for this patient. This report is scanned into the electronic medical record. I have considered the risks of abuse, dependence, addiction and diversion. It showed: Atjcej-Cklzbgsu-Qqeb -40, [facility]  OPIOID RISK ASSESSMENT SCORE 3/26  Aberrant behavior: None  My patient has no underlying substance abuse or alcohol abuse and there's no mental health conditions contributing to the patient's pain.        Diagnostic studies:  5/6/2025 CT lumbar spine showed straightening of the lumbar spine with degenerative changes at L4-5 and L5-S1 with mild spondylolisthesis at L4/L5 with mild to moderate canal stenosis at L4-5:          Employment/disability/litigation: Housewife,  is a caretaker    Social History: , has 3 children, finished [employer] schooling denies smoking drinking use of illicit drugs      Review of Systems       Physical Exam  Vitals and nursing note reviewed.   Constitutional:       Appearance: Normal appearance.       HENT:      Head: Normocephalic and atraumatic.      Nose: Nose normal.   Eyes:      Extraocular Movements: Extraocular movements intact.      Conjunctiva/sclera: Conjunctivae normal.      Pupils: Pupils are equal, round, and reactive to light.   Cardiovascular:      Rate and Rhythm: Normal rate and regular rhythm.      Pulses: Normal pulses.      Heart sounds: Normal heart sounds.   Pulmonary:      Effort: Pulmonary effort is normal.      Breath sounds: Normal breath sounds.   Abdominal:      General: Abdomen is flat. Bowel sounds are normal.      Palpations: Abdomen is soft.    Skin:     General: Skin is warm.   Neurological:      General: No focal deficit present.      Mental Status: She is alert and oriented to person, place, and time.      Cranial Nerves: Cranial nerves 2-12 are intact.      Sensory: Sensory deficit present.      Motor: Motor function is intact.      Coordination: Coordination is intact.      Gait: Tandem walk abnormal.      Deep Tendon Reflexes: Reflexes abnormal.      Comments: Loss of left knee reflex  Loss of vibratory sensation over the knee and decreased sensation in the anterior thigh  Sit slump severely positive on the left with back thigh shooting pain to the ankle   Psychiatric:         Mood and Affect: Mood normal.         Behavior: Behavior normal.            Assessment  45 years old with history of sudden onset without any trauma of lower back pain and left leg radiculopathy and numbness in the anterior thigh above the knee.  The patient went to the emergency room where CT scan done showed some degeneration but really could not assess the discs.  Her clinical exam correlate with pathology at the L2-3 in addition to the L5-S1.  The patient received good response to the steroid Medrol Dosepak.  I am going to start on the prednisone taper dose and start her on tizanidine 2 to 4 mg 3 times daily as needed since her methocarbamol is not helping.  I could not start her on the gabapentin since she stated that she is allergic to it she ended up having falling and joint pain after taking it!!  Patient is having the MRI done soon         Plan  At least 50% of the visit was involved in the discussion of the options for treatment. We discussed exercises, medication, interventional therapies and surgery. Healthy life style is essential with patient hard work to achieve the wellness. In addition; discussion with the patient and/or family about any of the diagnostic results, impressions and/or recommended diagnostic studies, prognosis, risks and benefits of treatment  options, instructions for treatment and/or follow-up, importance of compliance with chosen treatment options, risk-factor reduction, and patient/family education.           Recommend self-directed physical therapy with at least daily exercises for minimum of 20-minute  Referral to physical therapy  Referral to chiropractic care  Prednisone taper dose  Tizanidine 2 to 4 mg 3 times daily as needed  Patient to call office after MRI to discuss next plan  Healthy lifestyle and anti-inflammatory diet in addition to weight control discussed with the patient  Alternative chronic pain therapies was discussed, encouraged and information was handed  Return to Clinic after MRI     *Please note this report has been produced using speech recognition software and may contain errors related to that system including grammar, punctuation and spelling as well as words and phrases that may be inappropriate. If there are questions or concerns, please feel free to contact me to clarify.    Kenroy Nath MD

## 2025-05-14 NOTE — PROGRESS NOTES
No chief complaint on file.    History of Present Complaint:  The patient was referred to us by Referring Provider: ***. this is 45 y.o.  female  with a past history of anxiety/depression, obesity BMI 34, fibromyalgia, lower back pain with bilateral leg pain and numbness left worse than right  presenting with Lower back pain that radiates down both legs , left thigh and leg are worst .    Pain started 2 weeks now ( No ) injury or event .  Pain is better when laying in bed .  Pain is worst all day long . With activity .standing is worst can't stand bear weight .    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    Social history:  , has three children , no grandkids , patient obtained her GED, graduated with a pharmacy technician diploma..    Diagnostic studies: CT scan films        Zackary Each Box that Applies Female Male   FAMILY HISTORY OF SUBSTANCE ABUSE  Zackary the boxes that applies   Alcohol x  1    ? 3   Illegal drugs ?  2 ? 3   Rx drugs ?  4 ? 4   PERSONAL HISTORY OF SUBSTNACE ABUSE   Alcohol ?  3 ?  3   Illegal drugs ?  4 ?  4    Rx drugs ?  5 ?  5   Age Between 16-45 years x  1 ?  1   History of Preadolescent Sexual Abuse ?  3 ?  0   PSYCHOLOGIC DISEASE   ADD, OCD, bipolar, schizophrenia  Opioid Risk Assessment  ?  2 ?  2   Depression x  1 ?  1   Scoring Totals 3      Scoring (Risk)  0-3 - Low  4-7 - Moderate  8 - High  Opioid Risk Assessment Score 3/26

## 2025-05-14 NOTE — PROGRESS NOTES
[facility]  Department of Neurological Surgery  New Patient Visit    History of Present Illness:  Suzie Avila is a 45 y.o. year old female who presents to the spine clinic with 2 weeks of excruciating low back pain and radiculopathy posteriorly through her left hip and buttocks into her thigh.  She denies any inciting incident leading onset of her symptoms but has had multiple falls over the past 2 weeks.  Secondary to pain in her leg buckling underneath her.  Additional symptom of back spasms as well also extending through back and left thigh.  Numbness bilaterally her toes however more so on left foot.  Symptoms are worsened with standing, sitting, and walking while laying in bed and not moving does reduce the pain the most.  Continues to rate her pain 9/10.  She has sought care in emergency department where pain meds initiated and discharged with methocarbamol and prednisone.  She denies any significant improvement in low back and radicular symptoms.  CT lumbar spine does show disc bulge asymmetric to the left at L4-5.  She denies new balance disturbance or bowel or bladder incontinence.    Prior Spine Surgeries: none    Physical Therapy: no  Diabetic:      Osteoporosis: no  Patient's BMI is Body mass index is 34.22 kg/m².    14/14 systems reviewed and negative other than what is listed in the history of present illness    Problem List[1]  Medical History[2]  Surgical History[3]  Social History     Tobacco Use    Smoking status: Every Day     Current packs/day: 1.00     Types: Cigarettes    Smokeless tobacco: Never   Substance Use Topics    Alcohol use: Never     family history includes Cancer in her maternal grandmother; Diabetes in her paternal grandmother; Heart disease in her father; Stroke in her maternal grandmother.  Current Medications[4]  Allergies[5]    Physical Examination    General: Well developed, awake/alert/oriented x3, no distress, alert and cooperative  Skin: Warm  and dry, no lesions, no rashes  ENMT: Mucous membranes moist, no apparent injury, no lesions seen  Head/Neck: Neck Supple, no apparent injury  Respiratory/Thorax: Normal breath sounds with good chest expansion, thorax symmetric  Cardiovascular: No pitting edema, no JVD    Motor Strength: 4/5 left hip extension, left knee flexion, left ankle plantarflexion, otherwise 5/5 Throughout all extremities    Muscle Bulk: Normal and symmetric in all extremities    Posture:   -- Cervical: Normal  -- Thoracic: Normal  -- Lumbar : Normal  Paraspinal muscle spasm/tenderness bilateral lumbar approximately L4-5  Midline tenderness absent    Sensation: Mild deficit to sensation left anterior thigh continuing to shin, otherwise intact to light touch      Results    I personally reviewed and interpreted the imaging results which included CT lumbar spine with asymmetric to the left disc bulge at L4-5    Assessment and Plan:    Suzie Avila is a 45 y.o. year old female who presents to the spine clinic with 2 weeks of excruciating low back pain and radiculopathy posteriorly through her left hip and buttocks into her thigh.  She denies any inciting incident leading onset of her symptoms but has had multiple falls over the past 2 weeks.  Secondary to pain in her leg buckling underneath her.  Additional symptom of back spasms as well also extending through back and left thigh.  Numbness bilaterally her toes however more so on left foot.  Symptoms are worsened with standing, sitting, and walking while laying in bed and not moving does reduce the pain the most.  Continues to rate her pain 9/10.  She has sought care in emergency department where pain meds initiated and discharged with methocarbamol and prednisone.  She denies any significant improvement in low back and radicular symptoms.  CT lumbar spine does show disc bulge asymmetric to the left at L4-5.  She denies new balance disturbance or bowel or bladder incontinence.    Lumbar disc  bulge shadowed on CT scan correlates with her radicular sciatica-like symptoms.  MRI necessary for further evaluation of soft tissue/neural involvement.  I provided referrals to physical therapy and also pain management for further discussion of nonoperative treatment options for pain relief and prevention.  Further plan pending imaging and pain management response.      I have reviewed all prior documentation and reviewed the electronic medical record since admission. I have personally have reviewed all advanced imaging not just the reports and used my interpretation as documented as the relevant findings. I have reviewed the risks and benefits of all treatment recommendations listed in this note with the patient and family.       The above clinical summary has been dictated with voice recognition software. It has not been proofread for grammatical errors, typographical mistakes, or other semantic inconsistencies.    Thank you for visiting our office today. It was our pleasure to take part in your healthcare.     Do not hesitate to call with any questions regarding your plan of care after leaving at [phone] M-F 8am-4pm.     To clinicians, thank you very much for this kind referral. It is a privilege to partner with you in the care of your patients. My office would be delighted to assist you with any further consultations or with questions regarding the plan of care outlined. Do not hesitate to call the office or contact me directly.       Sincerely,      SADIA Jewell, PA-C  Associate Physician Assistant, Neurosurgery  Clinical   [facility]    [place] [address] [address] [place] [address]    Phone: [phone]  Fax: [phone]             [1]   Patient Active Problem List  Diagnosis    Anemia    Sicca    Fibromyalgia   [2]   Past Medical History:  Diagnosis Date    Anemia      Anxiety     Bilateral renal stones     Depression     Hyperlipidemia     Postural orthostatic tachycardia syndrome (POTS)     Seasonal affective disorder     Vitamin B12 deficiency     Vitamin D deficiency    [3]   Past Surgical History:  Procedure Laterality Date    CARDIAC CATHETERIZATION      NO STENTS     SECTION, CLASSIC  2014    DENTAL SURGERY      OTHER SURGICAL HISTORY      Multiple kidney stone removal surgeries    TONSILLECTOMY      TYMPANOSTOMY TUBE PLACEMENT     [4]   Current Outpatient Medications:     butalbital-acetaminophen-caff -40 mg tablet, Take 2 tablets by mouth every 4 hours if needed for headaches., Disp: , Rfl:     fexofenadine-pseudoephedrine (Allegra-D 24) 180-240 mg 24 hr tablet, Take 1 tablet by mouth once daily. Do not crush, chew, or split., Disp: , Rfl:     FLUoxetine (PROzac) 40 mg capsule, Take 1 capsule (40 mg) by mouth once daily., Disp: , Rfl:     LORazepam (Ativan) 1 mg tablet, Take 1 tablet (1 mg) by mouth every 6 hours if needed for anxiety., Disp: , Rfl:     meloxicam (Mobic) 15 mg tablet, Take 1 tablet (15 mg) by mouth once daily. (Patient not taking: Reported on 2025), Disp: 30 tablet, Rfl: 0    methocarbamol (Robaxin) 500 mg tablet, Take 1 tablet (500 mg) by mouth 2 times a day as needed for muscle spasms for up to 10 days., Disp: 20 tablet, Rfl: 0    Myrbetriq 50 mg tablet extended release 24 hr 24 hr tablet, Take 1 tablet (50 mg) by mouth early in the morning.., Disp: , Rfl:     ondansetron ODT (Zofran-ODT) 4 mg disintegrating tablet, Dissolve 1 tablet (4 mg) in the mouth every 8 hours if needed for nausea or vomiting for up to 15 doses., Disp: 15 tablet, Rfl: 0    oxyCODONE-acetaminophen (Percocet) 5-325 mg tablet, Take 1 tablet by mouth every 6 hours if needed for severe pain (7 - 10) for up to 12 doses. (Patient not taking: Reported on 3/19/2025), Disp: 12 tablet, Rfl: 0    progesterone (Prometrium) 100 mg capsule, Take 1 capsule (100 mg) by mouth  once daily., Disp: , Rfl:     pseudoephedrine (Sudafed) 30 mg tablet, Take 1 tablet (30 mg) by mouth every 4 hours if needed for congestion., Disp: , Rfl:     rosuvastatin (Crestor) 20 mg tablet, Take 1 tablet (20 mg) by mouth once daily., Disp: , Rfl:     vibegron (Gemtesa) 75 mg tablet, Take 1 tablet (75 mg) by mouth once daily., Disp: , Rfl:     ZOLMitriptan (Zomig) 2.5 mg tablet, Take 1 tablet (2.5 mg) by mouth 1 time if needed for migraine. May repeat in 2 hours if unresolved. Do not exceed 10 mg in 24 hours., Disp: , Rfl:   [5]   Allergies  Allergen Reactions    Gabapentin Swelling     Causes joints to swell     Morphine Other     SEVERE MUSCLE CONTRACTIONS    Topiramate Other     KIDNEY STONES    Vicodin [Hydrocodone-Acetaminophen] Itching

## 2025-05-15 ENCOUNTER — OFFICE VISIT (OUTPATIENT)
Dept: PAIN MEDICINE | Facility: CLINIC | Age: 45
End: 2025-05-15
Payer: MEDICAID

## 2025-05-15 VITALS
TEMPERATURE: 97.9 F | OXYGEN SATURATION: 93 % | WEIGHT: 230 LBS | DIASTOLIC BLOOD PRESSURE: 57 MMHG | RESPIRATION RATE: 18 BRPM | HEIGHT: 70 IN | SYSTOLIC BLOOD PRESSURE: 116 MMHG | HEART RATE: 75 BPM | BODY MASS INDEX: 32.93 KG/M2

## 2025-05-15 DIAGNOSIS — M54.41 ACUTE MIDLINE LOW BACK PAIN WITH BILATERAL SCIATICA: ICD-10-CM

## 2025-05-15 DIAGNOSIS — M54.42 ACUTE MIDLINE LOW BACK PAIN WITH BILATERAL SCIATICA: ICD-10-CM

## 2025-05-15 DIAGNOSIS — M51.16 LUMBAR DISC HERNIATION WITH RADICULOPATHY: ICD-10-CM

## 2025-05-15 PROCEDURE — 99204 OFFICE O/P NEW MOD 45 MIN: CPT | Performed by: ANESTHESIOLOGY

## 2025-05-15 PROCEDURE — 3008F BODY MASS INDEX DOCD: CPT | Performed by: ANESTHESIOLOGY

## 2025-05-15 PROCEDURE — 99214 OFFICE O/P EST MOD 30 MIN: CPT | Performed by: ANESTHESIOLOGY

## 2025-05-15 RX ORDER — PREDNISONE 10 MG/1
TABLET ORAL
Qty: 64 TABLET | Refills: 0 | Status: SHIPPED | OUTPATIENT
Start: 2025-05-15

## 2025-05-15 RX ORDER — TIZANIDINE 2 MG/1
TABLET ORAL
Qty: 180 TABLET | Refills: 1 | Status: SHIPPED | OUTPATIENT
Start: 2025-05-15

## 2025-05-15 SDOH — ECONOMIC STABILITY: FOOD INSECURITY: WITHIN THE PAST 12 MONTHS, THE FOOD YOU BOUGHT JUST DIDN'T LAST AND YOU DIDN'T HAVE MONEY TO GET MORE.: NEVER TRUE

## 2025-05-15 SDOH — ECONOMIC STABILITY: FOOD INSECURITY: WITHIN THE PAST 12 MONTHS, YOU WORRIED THAT YOUR FOOD WOULD RUN OUT BEFORE YOU GOT MONEY TO BUY MORE.: NEVER TRUE

## 2025-05-15 ASSESSMENT — ENCOUNTER SYMPTOMS
LOSS OF SENSATION IN FEET: 1
DEPRESSION: 1
OCCASIONAL FEELINGS OF UNSTEADINESS: 1

## 2025-05-15 ASSESSMENT — LIFESTYLE VARIABLES: TOTAL SCORE: 3

## 2025-05-15 ASSESSMENT — PATIENT HEALTH QUESTIONNAIRE - PHQ9
SUM OF ALL RESPONSES TO PHQ9 QUESTIONS 1 AND 2: 0
1. LITTLE INTEREST OR PLEASURE IN DOING THINGS: NOT AT ALL
2. FEELING DOWN, DEPRESSED OR HOPELESS: NOT AT ALL

## 2025-05-15 ASSESSMENT — PAIN - FUNCTIONAL ASSESSMENT: PAIN_FUNCTIONAL_ASSESSMENT: 0-10

## 2025-05-15 ASSESSMENT — PAIN SCALES - GENERAL
PAINLEVEL_OUTOF10: 10 - WORST POSSIBLE PAIN
PAINLEVEL_OUTOF10: 10-WORST PAIN EVER

## 2025-05-15 ASSESSMENT — PAIN DESCRIPTION - DESCRIPTORS: DESCRIPTORS: ACHING;SPASM

## 2025-05-15 NOTE — LETTER
May 15, 2025     Prince Loja PA-C  [address]  [facility] [address]    Patient: Suzie Avila   YOB: 1980   Date of Visit: 5/15/2025       Dear Dr. Prince Loja PA-C:    Thank you for referring Suzie Avila to me for evaluation. Below are my notes for this consultation.  If you have questions, please do not hesitate to call me. I look forward to following your patient along with you.       Sincerely,     Kenroy Nath MD      CC: No Recipients  ______________________________________________________________________________________      History of Present Complaint:  The patient was referred to us by Referring Provider: Prince loaj. this is 45 y.o.  female complaining by her daughter with a past history of anxiety/depression on lorazepam 1 mg twice daily, obesity BMI 34, migraine headache on Qsztgl-Ciuktxvx-Awsi -40, fibromyalgia, lower back pain with bilateral leg pain and numbness left worse than right presenting with significant lower back pain and left leg numbness in the anterior thigh to the knee and shooting pain down to the foot from the back of her thigh to her foot.  The patient stated she cannot stand on it she principally had to use the wheelchair just walk from her car to the office today which is around 100 yards distance.  The patient was given Medrol Dosepak for few days that helped but it is off now.  The patient is allergic to gabapentin cannot use it  The patient has no incontinence no saddle paresthesia no paralysis  The patient denies any red flags    5/14/2025 Prince loja note:  Suzie Avila is a 45 y.o. year old female who presents to the spine clinic with 2 weeks of excruciating low back pain and radiculopathy posteriorly through her left hip and buttocks into her thigh.  She denies any inciting incident leading onset of her symptoms but has had multiple falls over the past 2 weeks.  Secondary to pain in her leg buckling underneath her.   Additional symptom of back spasms as well also extending through back and left thigh.  Numbness bilaterally her toes however more so on left foot.  Symptoms are worsened with standing, sitting, and walking while laying in bed and not moving does reduce the pain the most.  Continues to rate her pain 9/10.  She has sought care in emergency department where pain meds initiated and discharged with methocarbamol and prednisone.  She denies any significant improvement in low back and radicular symptoms.  CT lumbar spine does show disc bulge asymmetric to the left at L4-5.  She denies new balance disturbance or bowel or bladder incontinence.         Procedures:   None    Portions of record reviewed for pertinent issues: active problem list, medication list, allergies, family history, social history, notes from last encounter, encounters, lab results, imaging and other available records.    I have personally reviewed the [facility] report for this patient. This report is scanned into the electronic medical record. I have considered the risks of abuse, dependence, addiction and diversion. It showed: Hdatuf-Sknqgjoa-Hcqx -40, [facility]  OPIOID RISK ASSESSMENT SCORE 3/26  Aberrant behavior: None  My patient has no underlying substance abuse or alcohol abuse and there's no mental health conditions contributing to the patient's pain.        Diagnostic studies:  5/6/2025 CT lumbar spine showed straightening of the lumbar spine with degenerative changes at L4-5 and L5-S1 with mild spondylolisthesis at L4/L5 with mild to moderate canal stenosis at L4-5:          Employment/disability/litigation: Housewife,  is a caretaker    Social History: , has 3 children, finished [employer] and [employer] schooling denies smoking drinking use of illicit drugs      Review of Systems       Physical Exam  Vitals and nursing note reviewed.   Constitutional:       Appearance: Normal appearance.        HENT:      Head: Normocephalic and atraumatic.      Nose: Nose normal.   Eyes:      Extraocular Movements: Extraocular movements intact.      Conjunctiva/sclera: Conjunctivae normal.      Pupils: Pupils are equal, round, and reactive to light.   Cardiovascular:      Rate and Rhythm: Normal rate and regular rhythm.      Pulses: Normal pulses.      Heart sounds: Normal heart sounds.   Pulmonary:      Effort: Pulmonary effort is normal.      Breath sounds: Normal breath sounds.   Abdominal:      General: Abdomen is flat. Bowel sounds are normal.      Palpations: Abdomen is soft.   Skin:     General: Skin is warm.   Neurological:      General: No focal deficit present.      Mental Status: She is alert and oriented to person, place, and time.      Cranial Nerves: Cranial nerves 2-12 are intact.      Sensory: Sensory deficit present.      Motor: Motor function is intact.      Coordination: Coordination is intact.      Gait: Tandem walk abnormal.      Deep Tendon Reflexes: Reflexes abnormal.      Comments: Loss of left knee reflex  Loss of vibratory sensation over the knee and decreased sensation in the anterior thigh  Sit slump severely positive on the left with back thigh shooting pain to the ankle   Psychiatric:         Mood and Affect: Mood normal.         Behavior: Behavior normal.            Assessment  45 years old with history of sudden onset without any trauma of lower back pain and left leg radiculopathy and numbness in the anterior thigh above the knee.  The patient went to the emergency room where CT scan done showed some degeneration but really could not assess the discs.  Her clinical exam correlate with pathology at the L2-3 in addition to the L5-S1.  The patient received good response to the steroid Medrol Dosepak.  I am going to start on the prednisone taper dose and start her on tizanidine 2 to 4 mg 3 times daily as needed since her methocarbamol is not helping.  I could not start her on the  gabapentin since she stated that she is allergic to it she ended up having falling and joint pain after taking it!!  Patient is having the MRI done soon         Plan  At least 50% of the visit was involved in the discussion of the options for treatment. We discussed exercises, medication, interventional therapies and surgery. Healthy life style is essential with patient hard work to achieve the wellness. In addition; discussion with the patient and/or family about any of the diagnostic results, impressions and/or recommended diagnostic studies, prognosis, risks and benefits of treatment options, instructions for treatment and/or follow-up, importance of compliance with chosen treatment options, risk-factor reduction, and patient/family education.           Recommend self-directed physical therapy with at least daily exercises for minimum of 20-minute  Referral to physical therapy  Referral to chiropractic care  Prednisone taper dose  Tizanidine 2 to 4 mg 3 times daily as needed  Patient to call office after MRI to discuss next plan  Healthy lifestyle and anti-inflammatory diet in addition to weight control discussed with the patient  Alternative chronic pain therapies was discussed, encouraged and information was handed  Return to Clinic after MRI     *Please note this report has been produced using speech recognition software and may contain errors related to that system including grammar, punctuation and spelling as well as words and phrases that may be inappropriate. If there are questions or concerns, please feel free to contact me to clarify.    Kenroy Nath MD

## 2025-05-29 ENCOUNTER — HOSPITAL ENCOUNTER (OUTPATIENT)
Dept: RADIOLOGY | Facility: CLINIC | Age: 45
Discharge: HOME | End: 2025-05-29
Payer: MEDICAID

## 2025-05-29 ENCOUNTER — TELEPHONE (OUTPATIENT)
Dept: NEUROSURGERY | Facility: CLINIC | Age: 45
End: 2025-05-29
Payer: MEDICAID

## 2025-05-29 DIAGNOSIS — M54.42 ACUTE MIDLINE LOW BACK PAIN WITH BILATERAL SCIATICA: ICD-10-CM

## 2025-05-29 DIAGNOSIS — M54.41 ACUTE MIDLINE LOW BACK PAIN WITH BILATERAL SCIATICA: ICD-10-CM

## 2025-05-29 DIAGNOSIS — M51.16 LUMBAR DISC HERNIATION WITH RADICULOPATHY: ICD-10-CM

## 2025-05-29 PROCEDURE — 72148 MRI LUMBAR SPINE W/O DYE: CPT

## 2025-05-30 ENCOUNTER — TELEPHONE (OUTPATIENT)
Dept: PAIN MEDICINE | Facility: CLINIC | Age: 45
End: 2025-05-30

## 2025-05-30 NOTE — TELEPHONE ENCOUNTER
Called to schedule pt for the transforaminal.    She has questions about the medications she will be getting.   Can someone call her and answer her questions so we can book her, if she decided

## 2025-06-18 ENCOUNTER — TELEPHONE (OUTPATIENT)
Dept: NEUROSURGERY | Facility: HOSPITAL | Age: 45
End: 2025-06-18
Payer: MEDICAID

## 2025-06-18 NOTE — TELEPHONE ENCOUNTER
Result Communication    Resulted Orders   MR lumbar spine wo IV contrast    Narrative    Interpreted By:  Ellen Dillard,   STUDY:  MR LUMBAR SPINE WO IV CONTRAST      INDICATION:  Signs/Symptoms:lumbar disc herniation with radiating leg pain,  numbness, and weakness      COMPARISON:  Correlated to CT abdomen and pelvis 05/06/2025.      ACCESSION NUMBER(S):  [id number]      ORDERING CLINICIAN:  DIANN JOHN      TECHNIQUE:  Multiplanar multisequence MRI of the lumbar spine was performed  without the administration of intravenous contrast, according to  standard protocol.      FINDINGS:  NUMBERING: The last well-formed disc space will be considered L5-S1,  which corresponds to axial T2 series 5 image 13 and 14.      ALIGNMENT: Straightening of usual lumbar lordosis.      VERTEBRAE: No acute fracture or aggressive osseous lesion. Marrow  signal is preserved. Vertebral body heights are maintained.      DISCS: Multilevel disc desiccation. Mild disc height loss at L4-5.      CONUS MEDULLARIS AND CAUDA EQUINA: The conus medullaris terminates at  L1-2. There is normal appearance of the conus medullaris and cauda  equina. Crowding of the cauda equina nerve roots at L4-5.      PARAVERTEBRAL SOFT TISSUES AND VISUALIZED RETROPERITONEUM: The  visualized paravertebral soft tissues appear within normal limits.      EVALUATION OF INDIVIDUAL LEVELS:  L1-2: No disc herniation  spinal canal or neuroforaminal stenosis.      L2-3: Shallow disc bulge with facet hypertrophy and infolding of  ligamentum flavum results in mild narrowing the spinal canal and  bilateral foramina.      L3-4: Disc bulge with superimposed right paracentral disc protrusion  contacting the traversing right L4 nerve root and mildly narrowing  the spinal canal. There is mild right-greater-than-left foraminal  stenosis.      L4-5: Disc bulge with facet hypertrophy and infolding of ligamentum  flavum results in moderate to severe narrowing of the spinal canal  and  crowding of the cauda equina nerve roots and effacement of  bilateral subarticular recesses. There is mild  right-greater-than-left foraminal stenosis.      L5-S1: No disc herniation  spinal canal or neuroforaminal stenosis.      LIMITED EVALUATION OF UPPER SACRUM AND SACROILIAC JOINTS: Mild  degenerative changes of the sacroiliac joints.        Impression    Multilevel degenerative change of the lumbar spine most notable at  L4-5 where there is moderate to severe canal stenosis and crowding of  the cauda equina nerve roots.      Signed by: Ellen Dillard 5/30/2025 3:29 PM  Dictation workstation:   [id number]       4:35 PM      Results were NOT successfully communicated with the patient and they did not acknowledge their understanding.    Voicemail left. MyChart msg sent.  Waiting to hear back from patient.

## 2025-06-20 ENCOUNTER — APPOINTMENT (OUTPATIENT)
Dept: PHYSICAL THERAPY | Facility: CLINIC | Age: 45
End: 2025-06-20
Payer: MEDICAID

## 2025-07-11 ENCOUNTER — APPOINTMENT (OUTPATIENT)
Dept: PHYSICAL THERAPY | Facility: CLINIC | Age: 45
End: 2025-07-11
Payer: MEDICAID

## 2025-07-25 ENCOUNTER — APPOINTMENT (OUTPATIENT)
Dept: INTEGRATIVE MEDICINE | Facility: CLINIC | Age: 45
End: 2025-07-25
Payer: MEDICAID

## (undated) DEVICE — TOWEL PACK, STERILE, 4/PACK, BLUE

## (undated) DEVICE — LASER FIBER, HOLMIUM MP 200

## (undated) DEVICE — BASKET, STONE, RETRIEVAL, NITINOL (4WIRE, 1.9 0 TIP)

## (undated) DEVICE — SOLUTION, IRRIGATION, STERILE WATER, 1000 ML, POUR BOTTLE

## (undated) DEVICE — Device

## (undated) DEVICE — GUIDEWIRE, DUAL SENSOR, .035 X 150 STRAIGHT,  3CM

## (undated) DEVICE — SYRINGE, LUER LOCK, 12ML

## (undated) DEVICE — GLOVE, SURGICAL, PROTEXIS PI , 7.0, PF, LF

## (undated) DEVICE — SOLUTION, IRRIGATION, SODIUM CHLORIDE 0.9%, 1000 ML, POUR BOTTLE

## (undated) DEVICE — DILATOR SET, COAXIAL STYLET, 8/10